# Patient Record
Sex: MALE | Race: WHITE | NOT HISPANIC OR LATINO | URBAN - METROPOLITAN AREA
[De-identification: names, ages, dates, MRNs, and addresses within clinical notes are randomized per-mention and may not be internally consistent; named-entity substitution may affect disease eponyms.]

---

## 2023-05-28 ENCOUNTER — INPATIENT (INPATIENT)
Facility: HOSPITAL | Age: 58
LOS: 4 days | Discharge: HOME | DRG: 580 | End: 2023-06-02
Attending: INTERNAL MEDICINE | Admitting: STUDENT IN AN ORGANIZED HEALTH CARE EDUCATION/TRAINING PROGRAM
Payer: COMMERCIAL

## 2023-05-28 VITALS
TEMPERATURE: 101 F | SYSTOLIC BLOOD PRESSURE: 136 MMHG | DIASTOLIC BLOOD PRESSURE: 90 MMHG | RESPIRATION RATE: 18 BRPM | WEIGHT: 240.08 LBS | HEART RATE: 102 BPM

## 2023-05-28 DIAGNOSIS — Z29.9 ENCOUNTER FOR PROPHYLACTIC MEASURES, UNSPECIFIED: ICD-10-CM

## 2023-05-28 DIAGNOSIS — E78.5 HYPERLIPIDEMIA, UNSPECIFIED: ICD-10-CM

## 2023-05-28 DIAGNOSIS — E03.9 HYPOTHYROIDISM, UNSPECIFIED: ICD-10-CM

## 2023-05-28 DIAGNOSIS — I10 ESSENTIAL (PRIMARY) HYPERTENSION: ICD-10-CM

## 2023-05-28 DIAGNOSIS — D35.2 BENIGN NEOPLASM OF PITUITARY GLAND: ICD-10-CM

## 2023-05-28 DIAGNOSIS — L03.90 CELLULITIS, UNSPECIFIED: ICD-10-CM

## 2023-05-28 DIAGNOSIS — E11.9 TYPE 2 DIABETES MELLITUS WITHOUT COMPLICATIONS: ICD-10-CM

## 2023-05-28 DIAGNOSIS — Z98.890 OTHER SPECIFIED POSTPROCEDURAL STATES: Chronic | ICD-10-CM

## 2023-05-28 DIAGNOSIS — L03.113 CELLULITIS OF RIGHT UPPER LIMB: ICD-10-CM

## 2023-05-28 LAB
ALBUMIN SERPL ELPH-MCNC: 4.6 G/DL — SIGNIFICANT CHANGE UP (ref 3.3–5)
ALP SERPL-CCNC: 38 U/L — LOW (ref 40–120)
ALT FLD-CCNC: 51 U/L — SIGNIFICANT CHANGE UP (ref 12–78)
ANION GAP SERPL CALC-SCNC: 6 MMOL/L — SIGNIFICANT CHANGE UP (ref 5–17)
APTT BLD: 30.4 SEC — SIGNIFICANT CHANGE UP (ref 27.5–35.5)
AST SERPL-CCNC: 26 U/L — SIGNIFICANT CHANGE UP (ref 15–37)
BASOPHILS # BLD AUTO: 0.04 K/UL — SIGNIFICANT CHANGE UP (ref 0–0.2)
BASOPHILS NFR BLD AUTO: 0.4 % — SIGNIFICANT CHANGE UP (ref 0–2)
BILIRUB SERPL-MCNC: 0.7 MG/DL — SIGNIFICANT CHANGE UP (ref 0.2–1.2)
BUN SERPL-MCNC: 17 MG/DL — SIGNIFICANT CHANGE UP (ref 7–23)
CALCIUM SERPL-MCNC: 10 MG/DL — SIGNIFICANT CHANGE UP (ref 8.5–10.1)
CHLORIDE SERPL-SCNC: 105 MMOL/L — SIGNIFICANT CHANGE UP (ref 96–108)
CO2 SERPL-SCNC: 28 MMOL/L — SIGNIFICANT CHANGE UP (ref 22–31)
CREAT SERPL-MCNC: 1.4 MG/DL — HIGH (ref 0.5–1.3)
CRP SERPL-MCNC: 7 MG/L — HIGH
EGFR: 58 ML/MIN/1.73M2 — LOW
EOSINOPHIL # BLD AUTO: 0.17 K/UL — SIGNIFICANT CHANGE UP (ref 0–0.5)
EOSINOPHIL NFR BLD AUTO: 1.5 % — SIGNIFICANT CHANGE UP (ref 0–6)
ERYTHROCYTE [SEDIMENTATION RATE] IN BLOOD: 4 MM/HR — SIGNIFICANT CHANGE UP (ref 0–20)
GLUCOSE SERPL-MCNC: 122 MG/DL — HIGH (ref 70–99)
HCT VFR BLD CALC: 54.8 % — HIGH (ref 39–50)
HGB BLD-MCNC: 18.8 G/DL — HIGH (ref 13–17)
IMM GRANULOCYTES NFR BLD AUTO: 0.4 % — SIGNIFICANT CHANGE UP (ref 0–0.9)
INR BLD: 1.03 RATIO — SIGNIFICANT CHANGE UP (ref 0.88–1.16)
LACTATE SERPL-SCNC: 1.6 MMOL/L — SIGNIFICANT CHANGE UP (ref 0.7–2)
LYMPHOCYTES # BLD AUTO: 1.89 K/UL — SIGNIFICANT CHANGE UP (ref 1–3.3)
LYMPHOCYTES # BLD AUTO: 16.6 % — SIGNIFICANT CHANGE UP (ref 13–44)
MCHC RBC-ENTMCNC: 32.2 PG — SIGNIFICANT CHANGE UP (ref 27–34)
MCHC RBC-ENTMCNC: 34.3 GM/DL — SIGNIFICANT CHANGE UP (ref 32–36)
MCV RBC AUTO: 93.8 FL — SIGNIFICANT CHANGE UP (ref 80–100)
MONOCYTES # BLD AUTO: 0.89 K/UL — SIGNIFICANT CHANGE UP (ref 0–0.9)
MONOCYTES NFR BLD AUTO: 7.8 % — SIGNIFICANT CHANGE UP (ref 2–14)
NEUTROPHILS # BLD AUTO: 8.37 K/UL — HIGH (ref 1.8–7.4)
NEUTROPHILS NFR BLD AUTO: 73.3 % — SIGNIFICANT CHANGE UP (ref 43–77)
NRBC # BLD: 0 /100 WBCS — SIGNIFICANT CHANGE UP (ref 0–0)
PLATELET # BLD AUTO: 277 K/UL — SIGNIFICANT CHANGE UP (ref 150–400)
POTASSIUM SERPL-MCNC: 4.2 MMOL/L — SIGNIFICANT CHANGE UP (ref 3.5–5.3)
POTASSIUM SERPL-SCNC: 4.2 MMOL/L — SIGNIFICANT CHANGE UP (ref 3.5–5.3)
PROT SERPL-MCNC: 8.7 G/DL — HIGH (ref 6–8.3)
PROTHROM AB SERPL-ACNC: 12.1 SEC — SIGNIFICANT CHANGE UP (ref 10.5–13.4)
RBC # BLD: 5.84 M/UL — HIGH (ref 4.2–5.8)
RBC # FLD: 13.3 % — SIGNIFICANT CHANGE UP (ref 10.3–14.5)
SODIUM SERPL-SCNC: 139 MMOL/L — SIGNIFICANT CHANGE UP (ref 135–145)
WBC # BLD: 11.4 K/UL — HIGH (ref 3.8–10.5)
WBC # FLD AUTO: 11.4 K/UL — HIGH (ref 3.8–10.5)

## 2023-05-28 PROCEDURE — 99223 1ST HOSP IP/OBS HIGH 75: CPT | Mod: GC

## 2023-05-28 PROCEDURE — 73130 X-RAY EXAM OF HAND: CPT | Mod: 26,RT

## 2023-05-28 PROCEDURE — 99285 EMERGENCY DEPT VISIT HI MDM: CPT

## 2023-05-28 RX ORDER — EMPAGLIFLOZIN 10 MG/1
0 TABLET, FILM COATED ORAL
Qty: 0 | Refills: 3 | DISCHARGE

## 2023-05-28 RX ORDER — GLUCAGON INJECTION, SOLUTION 0.5 MG/.1ML
1 INJECTION, SOLUTION SUBCUTANEOUS ONCE
Refills: 0 | Status: DISCONTINUED | OUTPATIENT
Start: 2023-05-28 | End: 2023-05-31

## 2023-05-28 RX ORDER — VANCOMYCIN HCL 1 G
1000 VIAL (EA) INTRAVENOUS ONCE
Refills: 0 | Status: COMPLETED | OUTPATIENT
Start: 2023-05-28 | End: 2023-05-28

## 2023-05-28 RX ORDER — METRONIDAZOLE 500 MG
500 TABLET ORAL EVERY 8 HOURS
Refills: 0 | Status: DISCONTINUED | OUTPATIENT
Start: 2023-05-28 | End: 2023-05-30

## 2023-05-28 RX ORDER — INSULIN LISPRO 100/ML
VIAL (ML) SUBCUTANEOUS
Refills: 0 | Status: DISCONTINUED | OUTPATIENT
Start: 2023-05-28 | End: 2023-05-30

## 2023-05-28 RX ORDER — SODIUM CHLORIDE 9 MG/ML
1000 INJECTION INTRAMUSCULAR; INTRAVENOUS; SUBCUTANEOUS ONCE
Refills: 0 | Status: COMPLETED | OUTPATIENT
Start: 2023-05-28 | End: 2023-05-28

## 2023-05-28 RX ORDER — LEVOTHYROXINE SODIUM 125 MCG
0 TABLET ORAL
Qty: 0 | Refills: 3 | DISCHARGE

## 2023-05-28 RX ORDER — ONDANSETRON 8 MG/1
4 TABLET, FILM COATED ORAL EVERY 8 HOURS
Refills: 0 | Status: DISCONTINUED | OUTPATIENT
Start: 2023-05-28 | End: 2023-05-31

## 2023-05-28 RX ORDER — DEXTROSE 50 % IN WATER 50 %
25 SYRINGE (ML) INTRAVENOUS ONCE
Refills: 0 | Status: DISCONTINUED | OUTPATIENT
Start: 2023-05-28 | End: 2023-05-31

## 2023-05-28 RX ORDER — ACETAMINOPHEN 500 MG
975 TABLET ORAL ONCE
Refills: 0 | Status: COMPLETED | OUTPATIENT
Start: 2023-05-28 | End: 2023-05-28

## 2023-05-28 RX ORDER — ICOSAPENT ETHYL 500 MG/1
0 CAPSULE, LIQUID FILLED ORAL
Qty: 0 | Refills: 3 | DISCHARGE

## 2023-05-28 RX ORDER — METFORMIN HYDROCHLORIDE 850 MG/1
0 TABLET ORAL
Qty: 0 | Refills: 3 | DISCHARGE

## 2023-05-28 RX ORDER — LOSARTAN POTASSIUM 100 MG/1
0 TABLET, FILM COATED ORAL
Qty: 0 | Refills: 0 | DISCHARGE

## 2023-05-28 RX ORDER — DEXTROSE 50 % IN WATER 50 %
15 SYRINGE (ML) INTRAVENOUS ONCE
Refills: 0 | Status: DISCONTINUED | OUTPATIENT
Start: 2023-05-28 | End: 2023-05-31

## 2023-05-28 RX ORDER — DEXTROSE 50 % IN WATER 50 %
12.5 SYRINGE (ML) INTRAVENOUS ONCE
Refills: 0 | Status: DISCONTINUED | OUTPATIENT
Start: 2023-05-28 | End: 2023-05-31

## 2023-05-28 RX ORDER — LANOLIN ALCOHOL/MO/W.PET/CERES
3 CREAM (GRAM) TOPICAL AT BEDTIME
Refills: 0 | Status: DISCONTINUED | OUTPATIENT
Start: 2023-05-28 | End: 2023-05-31

## 2023-05-28 RX ORDER — LEVOTHYROXINE SODIUM 125 MCG
50 TABLET ORAL DAILY
Refills: 0 | Status: DISCONTINUED | OUTPATIENT
Start: 2023-05-28 | End: 2023-05-31

## 2023-05-28 RX ORDER — ACETAMINOPHEN 500 MG
650 TABLET ORAL EVERY 6 HOURS
Refills: 0 | Status: DISCONTINUED | OUTPATIENT
Start: 2023-05-28 | End: 2023-05-31

## 2023-05-28 RX ORDER — SACCHAROMYCES BOULARDII 250 MG
250 POWDER IN PACKET (EA) ORAL
Refills: 0 | Status: DISCONTINUED | OUTPATIENT
Start: 2023-05-28 | End: 2023-05-31

## 2023-05-28 RX ORDER — ENOXAPARIN SODIUM 100 MG/ML
40 INJECTION SUBCUTANEOUS EVERY 24 HOURS
Refills: 0 | Status: DISCONTINUED | OUTPATIENT
Start: 2023-05-28 | End: 2023-05-30

## 2023-05-28 RX ORDER — ATORVASTATIN CALCIUM 80 MG/1
40 TABLET, FILM COATED ORAL AT BEDTIME
Refills: 0 | Status: DISCONTINUED | OUTPATIENT
Start: 2023-05-28 | End: 2023-05-31

## 2023-05-28 RX ORDER — SIMVASTATIN 20 MG/1
0 TABLET, FILM COATED ORAL
Qty: 0 | Refills: 3 | DISCHARGE

## 2023-05-28 RX ORDER — CIPROFLOXACIN LACTATE 400MG/40ML
400 VIAL (ML) INTRAVENOUS ONCE
Refills: 0 | Status: DISCONTINUED | OUTPATIENT
Start: 2023-05-28 | End: 2023-05-28

## 2023-05-28 RX ORDER — SODIUM CHLORIDE 9 MG/ML
1000 INJECTION, SOLUTION INTRAVENOUS
Refills: 0 | Status: DISCONTINUED | OUTPATIENT
Start: 2023-05-28 | End: 2023-05-31

## 2023-05-28 RX ORDER — SODIUM CHLORIDE 9 MG/ML
1000 INJECTION, SOLUTION INTRAVENOUS ONCE
Refills: 0 | Status: COMPLETED | OUTPATIENT
Start: 2023-05-28 | End: 2023-05-28

## 2023-05-28 RX ORDER — LOSARTAN POTASSIUM 100 MG/1
100 TABLET, FILM COATED ORAL DAILY
Refills: 0 | Status: DISCONTINUED | OUTPATIENT
Start: 2023-05-28 | End: 2023-05-31

## 2023-05-28 RX ORDER — INSULIN LISPRO 100/ML
VIAL (ML) SUBCUTANEOUS AT BEDTIME
Refills: 0 | Status: DISCONTINUED | OUTPATIENT
Start: 2023-05-28 | End: 2023-05-30

## 2023-05-28 RX ORDER — CABERGOLINE 0.5 MG/1
0 TABLET ORAL
Qty: 0 | Refills: 3 | DISCHARGE

## 2023-05-28 RX ADMIN — Medication 100 MILLIGRAM(S): at 22:30

## 2023-05-28 RX ADMIN — Medication 975 MILLIGRAM(S): at 19:34

## 2023-05-28 RX ADMIN — SODIUM CHLORIDE 1000 MILLILITER(S): 9 INJECTION INTRAMUSCULAR; INTRAVENOUS; SUBCUTANEOUS at 19:34

## 2023-05-28 RX ADMIN — Medication 250 MILLIGRAM(S): at 22:29

## 2023-05-28 RX ADMIN — Medication 100 MILLIGRAM(S): at 20:40

## 2023-05-28 RX ADMIN — ATORVASTATIN CALCIUM 40 MILLIGRAM(S): 80 TABLET, FILM COATED ORAL at 22:30

## 2023-05-28 RX ADMIN — SODIUM CHLORIDE 1000 MILLILITER(S): 9 INJECTION, SOLUTION INTRAVENOUS at 20:48

## 2023-05-28 RX ADMIN — ENOXAPARIN SODIUM 40 MILLIGRAM(S): 100 INJECTION SUBCUTANEOUS at 20:40

## 2023-05-28 NOTE — ED PROVIDER NOTE - PROGRESS NOTE DETAILS
Case discussed with Dr Connell (Hand). Advised to change abx to clindamycin and vancomycin and admit. Will see pt tomorrow.

## 2023-05-28 NOTE — ED PROVIDER NOTE - PHYSICAL EXAMINATION
PE:   GEN: Awake, alert, interactive, NAD, non-toxic appearing.   HEAD: Atraumatic  CARDIAC: Reg rate and rhythm, S1,S2, no murmur/rub/gallop. Strong central and peripheral pulses, Brisk cap refill, no evident pedal edema.   RESP: No distress noted. L/S clear = Bilat without accessory muscle use, wheeze, rhonchi, rales.   ABD: soft, supple, non-tender, no guarding. BS x 4, normoactive.   NEURO: AOx3, CN II-XII grossly intact without focal deficit.   MSK: Moving all extremities with no apparent deformities. unable to bend R index finger secondary to pain and swelling, sensation intact ROM all other fingers on R hand intact   SKIN: puncture wounds noted over proximal aspect of R 2nd and 3rd digits over joint, TTP, swollen, no draining

## 2023-05-28 NOTE — H&P ADULT - PROBLEM SELECTOR PLAN 3
Chronic, History of DM2  - HOLD home meds - Metformin, Jardiance, Empagliflozin   - Continue   - Low dose insulin corrective scale  - Hypoglycemia protocol, fingerstick glucose QAC&HS  - F/u AM HbA1c Chronic, History of DM2  - HOLD home meds - Metformin, Jardiance, Empagliflozin   - Continue low dose insulin corrective scale  - Hypoglycemia protocol, fingerstick glucose QAC&HS  - F/u AM HbA1c

## 2023-05-28 NOTE — ED PROVIDER NOTE - CLINICAL SUMMARY MEDICAL DECISION MAKING FREE TEXT BOX
58-year-old male with a history of diabetes, hyperlipidemia, hypothyroidism, presents with right hand redness and swelling with fever today.  Patient was bitten by his cat that was vaccinated last night.  Bite was on his right hand second and third digits.  This morning woke up with redness warmth and swelling.  Patient was seen in urgent care and sent to the ER for further eval.  Patient with swelling of the right hand redness warmth and swelling and puncture wounds pulses are intact does not extend past the hand has difficulty ranging his second digit.  Will treat with IV antibiotics for cellulitis/ infected bite, hand surg eval for possible asbcess

## 2023-05-28 NOTE — ED PROVIDER NOTE - OBJECTIVE STATEMENT
57 y/o m with pmh DM2 presents to ED for c/o cat bite to R hand. Pt states it was hit own cat and occurred last night. Went to urgent care today for wrosening pain and swelling and was sent to ED for IV abx. Pt statea also now with trouble bending R index finger. Pt is right hand dominant. Denies fever chills. TDAP up to date.

## 2023-05-28 NOTE — H&P ADULT - HISTORY OF PRESENT ILLNESS
58y right hand dominant male with PMHx of type 2 DM, HTN, HLD, hypothyroidism presented to the ED complaining of cat bite to right hand with redness, swelling and fever. 58y right hand dominant (but ambidextrous) male with PMHx of type 2 DM, HTN, HLD, hypothyroidism, pituitary adenoma presented to the ED complaining of cat bite to right hand with redness, swelling and fever. States that it was his mother's cat and recently had bloodwork - up to date on all vaccines. 57 yo right hand dominant (but ambidextrous) male with PMHx of type 2 DM, HTN, HLD, hypothyroidism, pituitary adenoma presented to the ED complaining of cat bite to right hand with redness, swelling and fever. Pt's mother-in-law passed away recently, therefore pt brought home her cat. Yesterday, his mother-in-law's cat bit his right hand near his second MCP joint. The cat recently had blood work - up to date on all vaccines. Pt reports right hand redness, warmth, pain on ROM of the second digit. Pt is ambidextrous, but uses his right hand to write. Pt also plays the piano and drums often.   Denies fever, chills, chest pain, palpitations, SOB, cough, abdominal pain, nausea, vomiting, diarrhea, dizziness, numbness, tingling.  Denies recent travel, recent antibiotic use, or sick contacts.    ED Course:   Vitals: BP: 136/90, HR: 102, Temp: 100.6 F, RR: 18, SpO2: 100% on RA   Labs: wbc: 11.40, H/H: 18.8/54.8, Cr: 1.40  Xray Hand: no acute fx, as per personal read, official read pending   CXR: --- as per personal read, official read pending   Received Tylenol 975 mg x1, 1L NS bolus x1, 1L LR bolus x1, Vanco, Clindamycin in the ED    59 yo right hand dominant (but ambidextrous) male with PMHx of type 2 DM, HTN, HLD, hypothyroidism, pituitary adenoma presented to the ED complaining of cat bite to right hand with redness, swelling and fever. Pt's mother-in-law passed away recently, therefore pt brought home her cat. Yesterday, his mother-in-law's cat bit his right hand near his second MCP joint. The cat recently had blood work - up to date on all vaccines. Pt reports right hand redness, warmth, pain on ROM of the second digit. Pt is ambidextrous, but uses his right hand to write. Pt also plays the piano and drums often.   Denies fever, chills, chest pain, palpitations, SOB, cough, abdominal pain, nausea, vomiting, diarrhea, dizziness, numbness, tingling.  Denies recent travel, recent antibiotic use, or sick contacts.    ED Course:   Vitals: BP: 136/90, HR: 102, Temp: 100.6 F, RR: 18, SpO2: 100% on RA   Labs: wbc: 11.40, H/H: 18.8/54.8, Cr: 1.40  Xray Hand: no acute fx, as per personal read, official read pending  Received Tylenol 975 mg x1, 1L NS bolus x1, 1L LR bolus x1, Vanco, Clindamycin in the ED

## 2023-05-28 NOTE — H&P ADULT - PROBLEM SELECTOR PLAN 1
Pt meets sepsis criteria on admission   - Admit to [facility]  - wbc: 11.40 on admission   - s/p Tylenol 975 mg x1, 1L NS bolus x1, 1L LR bolus x1, Vanco, Clindamycin in the ED   - Continue Vancomycin and Clindamycin as per ID recs   - Keep affected extremity elevated, with warm compresses PRN  - Tylenol 650 mg PO Q6H PRN pain and/or fever  - Follow up BCx x2, MRSA  - Xray R hand: no acute fx as per personal read, f/u official read   - Monitor fever and leukocytosis   - ID Dr. Patton consulted, f/u recs  - Hand surgeon Dr. Valente consulted by ED, recs appreciated Pt meets sepsis criteria on admission   - Admit to [facility]  - wbc: 11.40 on admission   - s/p Tylenol 975 mg x1, 1L NS bolus x1, 1L LR bolus x1, Vanco, Clindamycin in the ED   - Continue Doxycycline and Metronidazole as per ID recs   - Keep affected extremity elevated, with warm compresses PRN  - Tylenol 650 mg PO Q6H PRN pain and/or fever  - Follow up BCx x2, MRSA  - Xray R hand: no acute fx as per personal read, f/u official read   - Monitor fever and leukocytosis   - ID Dr. Patton consulted, f/u recs  - Hand surgeon Dr. Valente consulted by ED, recs appreciated Pt meets sepsis criteria on admission   - Admit to [facility]  - wbc: 11.40 on admission   - s/p Tylenol 975 mg x1, 1L NS bolus x1, 1L LR bolus x1, Vanco, Clindamycin in the ED   - Continue Doxycycline and Metronidazole as per ID recs   - Keep affected extremity elevated, with warm compresses PRN  - Tylenol 650 mg PO Q6H PRN pain and/or fever  - Follow up BCx x2, MRSA PCR, ESR, C-reactive protein  - Xray R hand: no acute fx as per personal read, f/u official read   - Monitor fever and leukocytosis   - ID Dr. Kristina Patton ([facility]) consulted, f/u recs  - Hand surgeon Dr. Valente consulted by ED, recs appreciated

## 2023-05-28 NOTE — PATIENT PROFILE ADULT - FALL HARM RISK - UNIVERSAL INTERVENTIONS
Bed in lowest position, wheels locked, appropriate side rails in place/Call bell, personal items and telephone in reach/Instruct patient to call for assistance before getting out of bed or chair/Non-slip footwear when patient is out of bed/[place] to call system/Physically safe environment - no spills, clutter or unnecessary equipment/Purposeful Proactive Rounding/Room/bathroom lighting operational, light cord in reach

## 2023-05-28 NOTE — ED ADULT TRIAGE NOTE - CHIEF COMPLAINT QUOTE
" cat bite to rt hand Yesterday, swollen , pain" " cat bite to rt hand Yesterday, swollen , pain" from  Care

## 2023-05-28 NOTE — H&P ADULT - NSICDXFAMILYHX_GEN_ALL_CORE_FT
FAMILY HISTORY:  FH: HTN (hypertension)    Father  Still living? Unknown  Family history of CLL (chronic lymphoid leukemia), Age at diagnosis: Age Unknown    Mother  Still living? Unknown  Family history of CLL (chronic lymphoid leukemia), Age at diagnosis: Age Unknown

## 2023-05-28 NOTE — H&P ADULT - ATTENDING COMMENTS
58 year old RHD male with PMHx of type 2 DM, HTN, HLD, hypothyroidism, pituitary adenoma admitted with sepsis secondary to cat bite cellulitis of right hand with concern for deep infection/tenosynovitis. Admit to medicine. Given clinda/vanco in ER per Hand surgery recs to ER provider. Discussed with ID Dr. Patton ([facility]) - recs starting doxy/flagyl to cover MRSA, pasteurella & anaerobes. Hand surgery Dr. Valente to see in AM. If no improvement with IV antibiotics, consider MR imaging of hand. May require surgical intervention if worsening. Follow up culture data. Discussed with patient at bedside. Endorsed case to [facility] hospitalist Dr. Koo.    Agree with H&P as outlined above, edited where appropriate.

## 2023-05-28 NOTE — H&P ADULT - PROBLEM SELECTOR PLAN 5
Stable, gets annual MRI for monitoring   - On Cabergoline once a week on Fridays Stable  - Continue Synthroid

## 2023-05-28 NOTE — H&P ADULT - NSHPPHYSICALEXAM_GEN_ALL_CORE
T(C): 38.1 (05-28-23 @ 18:33), Max: 38.1 (05-28-23 @ 18:33)  HR: 102 (05-28-23 @ 18:33) (102 - 102)  BP: 136/90 (05-28-23 @ 18:33) (136/90 - 136/90)  RR: 18 (05-28-23 @ 18:33) (18 - 18)  SpO2: 99% on RA    General: No apparent distress  Head: normocephalic, atraumatic  Eyes: EOMI, anicteric  ENT: moist mucous membranes, no pharyngeal exudates  Heart: RRR, S1, S2, no murmurs  Chest: CTA b/l, no rales, rhonchi, or wheezes  Abd: BS+, soft, NT, ND  Back: no CVA tenderness  Extr: no edema or cyanosis  RIGHT HAND: puncture wound noted with surrounding erythema, edema and warmth to touch, decreased ROM of first digit  Neuro: AA&Ox3, no focal weakness, sensation to light touch intact  Psych: normal affect

## 2023-05-28 NOTE — H&P ADULT - ASSESSMENT
58 year old RHD male with PMHx of type 2 DM, HTN, HLD, hypothyroidism admitted with sepsis secondary to cat bite cellulitis of right hand. 58 year old RHD male with PMHx of type 2 DM, HTN, HLD, hypothyroidism, pituitary adenoma admitted with sepsis secondary to cat bite cellulitis of right hand with concern for deep infection/tenosynovitis. 58 year old RHD male with PMHx of type 2 DM, HTN, HLD, hypothyroidism, pituitary adenoma admitted with sepsis secondary to cat bite cellulitis of right hand with concern for deep infection/tenosynovitis.

## 2023-05-28 NOTE — H&P ADULT - NSICDXPASTMEDICALHX_GEN_ALL_CORE_FT
PAST MEDICAL HISTORY:  DM2 (diabetes mellitus, type 2)     HLD (hyperlipidemia)     HTN (hypertension)     Hypothyroidism     Pituitary adenoma

## 2023-05-28 NOTE — H&P ADULT - NSHPREVIEWOFSYSTEMS_GEN_ALL_CORE
CONSTITUTIONAL: denies fever, chills, fatigue, weakness  HEENT: denies blurred vision, sore throat  SKIN: admits to right hand swelling and redness   CARDIOVASCULAR: denies chest pain, chest pressure, palpitations  RESPIRATORY: denies shortness of breath, cough, sputum production  GASTROINTESTINAL: denies nausea, vomiting, diarrhea, abdominal pain, melena or hematochezia  GENITOURINARY: denies dysuria, discharge  NEUROLOGICAL: denies numbness, headache, focal weakness  MUSCULOSKELETAL: admits right second digit pain   HEMATOLOGIC: denies gross bleeding, bruising

## 2023-05-29 ENCOUNTER — TRANSCRIPTION ENCOUNTER (OUTPATIENT)
Age: 58
End: 2023-05-29

## 2023-05-29 PROCEDURE — 93010 ELECTROCARDIOGRAM REPORT: CPT

## 2023-05-29 RX ADMIN — Medication 100 MILLIGRAM(S): at 13:33

## 2023-05-29 RX ADMIN — Medication 50 MICROGRAM(S): at 05:33

## 2023-05-29 RX ADMIN — Medication 250 MILLIGRAM(S): at 05:33

## 2023-05-29 RX ADMIN — Medication 100 MILLIGRAM(S): at 21:00

## 2023-05-29 RX ADMIN — Medication 650 MILLIGRAM(S): at 18:52

## 2023-05-29 RX ADMIN — ENOXAPARIN SODIUM 40 MILLIGRAM(S): 100 INJECTION SUBCUTANEOUS at 21:48

## 2023-05-29 RX ADMIN — Medication 100 MILLIGRAM(S): at 21:47

## 2023-05-29 RX ADMIN — Medication 100 MILLIGRAM(S): at 05:33

## 2023-05-29 RX ADMIN — Medication 100 MILLIGRAM(S): at 05:34

## 2023-05-29 RX ADMIN — Medication 250 MILLIGRAM(S): at 18:46

## 2023-05-29 RX ADMIN — ATORVASTATIN CALCIUM 40 MILLIGRAM(S): 80 TABLET, FILM COATED ORAL at 21:48

## 2023-05-29 RX ADMIN — Medication 650 MILLIGRAM(S): at 19:22

## 2023-05-29 NOTE — PROGRESS NOTE ADULT - ASSESSMENT
58M with DM, HTN, HLD, hypothyroidism, pituitary adenoma admitted with hand infection after cat bite  ID following  continue abx  hand surgery to evaluate  MRI  high risk due to proximity to joint (2nd MCP), reduced ROM    HTN/HLD  stable  continue statin losartan    DM  diabetic diet  FS/SS    hypothyroid  continue synthroid    polycytemia  baseline hgb 17  on testosterone  may be related to current disease process

## 2023-05-29 NOTE — CONSULT NOTE ADULT - ASSESSMENT
57 yo male with PMHx of type 2 DM, HTN, HLD, hypothyroidism, pituitary adenoma presented to the ED complaining of cat bite to right hand with redness, swelling and fever.   Yesterday, his mother-in-law's cat bit his right hand near his second MCP joint.   cellulitis r/o deep infection    plan  xray not impressive  check mri  pt got tetanus at urgent care  cont doxycycline + flagyl  keep elevated  serial exam  trend wbc   Dr Connell evaluation pending   d/w Dr Koo

## 2023-05-30 ENCOUNTER — TRANSCRIPTION ENCOUNTER (OUTPATIENT)
Age: 58
End: 2023-05-30

## 2023-05-30 LAB
ANION GAP SERPL CALC-SCNC: 2 MMOL/L — LOW (ref 5–17)
BASOPHILS # BLD AUTO: 0.05 K/UL — SIGNIFICANT CHANGE UP (ref 0–0.2)
BASOPHILS NFR BLD AUTO: 0.7 % — SIGNIFICANT CHANGE UP (ref 0–2)
BUN SERPL-MCNC: 13 MG/DL — SIGNIFICANT CHANGE UP (ref 7–23)
CALCIUM SERPL-MCNC: 9.1 MG/DL — SIGNIFICANT CHANGE UP (ref 8.5–10.1)
CHLORIDE SERPL-SCNC: 105 MMOL/L — SIGNIFICANT CHANGE UP (ref 96–108)
CO2 SERPL-SCNC: 28 MMOL/L — SIGNIFICANT CHANGE UP (ref 22–31)
CREAT SERPL-MCNC: 1.3 MG/DL — SIGNIFICANT CHANGE UP (ref 0.5–1.3)
CRP SERPL-MCNC: 22 MG/L — HIGH
EGFR: 63 ML/MIN/1.73M2 — SIGNIFICANT CHANGE UP
EOSINOPHIL # BLD AUTO: 0.3 K/UL — SIGNIFICANT CHANGE UP (ref 0–0.5)
EOSINOPHIL NFR BLD AUTO: 4.1 % — SIGNIFICANT CHANGE UP (ref 0–6)
GLUCOSE SERPL-MCNC: 227 MG/DL — HIGH (ref 70–99)
HCT VFR BLD CALC: 50.6 % — HIGH (ref 39–50)
HGB BLD-MCNC: 16.9 G/DL — SIGNIFICANT CHANGE UP (ref 13–17)
IMM GRANULOCYTES NFR BLD AUTO: 0.4 % — SIGNIFICANT CHANGE UP (ref 0–0.9)
LYMPHOCYTES # BLD AUTO: 1.49 K/UL — SIGNIFICANT CHANGE UP (ref 1–3.3)
LYMPHOCYTES # BLD AUTO: 20.2 % — SIGNIFICANT CHANGE UP (ref 13–44)
MCHC RBC-ENTMCNC: 31.4 PG — SIGNIFICANT CHANGE UP (ref 27–34)
MCHC RBC-ENTMCNC: 33.4 GM/DL — SIGNIFICANT CHANGE UP (ref 32–36)
MCV RBC AUTO: 94.1 FL — SIGNIFICANT CHANGE UP (ref 80–100)
MONOCYTES # BLD AUTO: 0.54 K/UL — SIGNIFICANT CHANGE UP (ref 0–0.9)
MONOCYTES NFR BLD AUTO: 7.3 % — SIGNIFICANT CHANGE UP (ref 2–14)
NEUTROPHILS # BLD AUTO: 4.96 K/UL — SIGNIFICANT CHANGE UP (ref 1.8–7.4)
NEUTROPHILS NFR BLD AUTO: 67.3 % — SIGNIFICANT CHANGE UP (ref 43–77)
NRBC # BLD: 0 /100 WBCS — SIGNIFICANT CHANGE UP (ref 0–0)
PLATELET # BLD AUTO: 207 K/UL — SIGNIFICANT CHANGE UP (ref 150–400)
POTASSIUM SERPL-MCNC: 4.2 MMOL/L — SIGNIFICANT CHANGE UP (ref 3.5–5.3)
POTASSIUM SERPL-SCNC: 4.2 MMOL/L — SIGNIFICANT CHANGE UP (ref 3.5–5.3)
RBC # BLD: 5.38 M/UL — SIGNIFICANT CHANGE UP (ref 4.2–5.8)
RBC # FLD: 13.1 % — SIGNIFICANT CHANGE UP (ref 10.3–14.5)
SODIUM SERPL-SCNC: 135 MMOL/L — SIGNIFICANT CHANGE UP (ref 135–145)
WBC # BLD: 7.37 K/UL — SIGNIFICANT CHANGE UP (ref 3.8–10.5)
WBC # FLD AUTO: 7.37 K/UL — SIGNIFICANT CHANGE UP (ref 3.8–10.5)

## 2023-05-30 PROCEDURE — 73220 MRI UPPR EXTREMITY W/O&W/DYE: CPT | Mod: 26,RT

## 2023-05-30 RX ORDER — BACITRACIN ZINC 500 UNIT/G
1 OINTMENT IN PACKET (EA) TOPICAL
Refills: 0 | Status: DISCONTINUED | OUTPATIENT
Start: 2023-05-30 | End: 2023-05-31

## 2023-05-30 RX ORDER — CEFTRIAXONE 500 MG/1
2000 INJECTION, POWDER, FOR SOLUTION INTRAMUSCULAR; INTRAVENOUS EVERY 24 HOURS
Refills: 0 | Status: DISCONTINUED | OUTPATIENT
Start: 2023-05-30 | End: 2023-05-31

## 2023-05-30 RX ORDER — METFORMIN HYDROCHLORIDE 850 MG/1
500 TABLET ORAL THREE TIMES A DAY
Refills: 0 | Status: DISCONTINUED | OUTPATIENT
Start: 2023-05-30 | End: 2023-05-31

## 2023-05-30 RX ADMIN — Medication 50 MICROGRAM(S): at 06:44

## 2023-05-30 RX ADMIN — METFORMIN HYDROCHLORIDE 500 MILLIGRAM(S): 850 TABLET ORAL at 22:30

## 2023-05-30 RX ADMIN — CEFTRIAXONE 100 MILLIGRAM(S): 500 INJECTION, POWDER, FOR SOLUTION INTRAMUSCULAR; INTRAVENOUS at 14:08

## 2023-05-30 RX ADMIN — Medication 100 MILLIGRAM(S): at 06:43

## 2023-05-30 RX ADMIN — Medication 1 APPLICATION(S): at 18:08

## 2023-05-30 RX ADMIN — LOSARTAN POTASSIUM 100 MILLIGRAM(S): 100 TABLET, FILM COATED ORAL at 06:44

## 2023-05-30 RX ADMIN — Medication 100 MILLIGRAM(S): at 18:08

## 2023-05-30 RX ADMIN — Medication 3 MILLIGRAM(S): at 22:30

## 2023-05-30 RX ADMIN — Medication 650 MILLIGRAM(S): at 15:03

## 2023-05-30 RX ADMIN — Medication 650 MILLIGRAM(S): at 14:08

## 2023-05-30 RX ADMIN — Medication 650 MILLIGRAM(S): at 06:53

## 2023-05-30 RX ADMIN — ATORVASTATIN CALCIUM 40 MILLIGRAM(S): 80 TABLET, FILM COATED ORAL at 22:30

## 2023-05-30 RX ADMIN — Medication 650 MILLIGRAM(S): at 07:15

## 2023-05-30 RX ADMIN — Medication 250 MILLIGRAM(S): at 07:16

## 2023-05-30 RX ADMIN — Medication 250 MILLIGRAM(S): at 18:08

## 2023-05-30 RX ADMIN — Medication 650 MILLIGRAM(S): at 21:45

## 2023-05-30 RX ADMIN — Medication 650 MILLIGRAM(S): at 20:45

## 2023-05-30 RX ADMIN — Medication 100 MILLIGRAM(S): at 06:44

## 2023-05-30 NOTE — CARE COORDINATION ASSESSMENT. - NSPASTMEDSURGHISTORY_GEN_ALL_CORE_FT
PAST MEDICAL & SURGICAL HISTORY:  Hypothyroidism      Pituitary adenoma      HLD (hyperlipidemia)      HTN (hypertension)      DM2 (diabetes mellitus, type 2)      S/P rotator cuff surgery

## 2023-05-30 NOTE — PROGRESS NOTE ADULT - ASSESSMENT
59 yo male with PMHx of type 2 DM, HTN, HLD, hypothyroidism, pituitary adenoma presented to the ED complaining of cat bite to right hand with redness, swelling and fever, his mother-in-law's cat bit his right hand into his second MCP joint. Seen by Hand and underwent initial I&D of right hand.  MRI-Moderate joint effusion of the second metacarpophalangeal joint with enhancing synovitis, concerning for septic arthritis given the history of a puncture wound. Minimal osseous edema along the dorsal aspect of the second metacarpal head/neck without T1 marrow signal abnormality, which may represent contusion or be related to osteitis/early osteoarthritis.    RECOMMENDATIONS  Cat bite with what appears to be penetration into second MCP joint so septic arthritis, concerns for cat mouth microbes and skin. PCN allergy is just a brandin and questionable. Doxy is reasonable coverage for this context but will dc metronidazole so for now recommend  -Doxycycline 100mg PO BID  -Ceftriaxone 2 grams IV daily (may adjust if a reaction and based on any micro data)  anticipate 6 weeks abx but potential for oral if option with good bioavail  -hand surgeon to take to OR for joint washout.    Thank you for consulting us and involving us in the management of this most interesting and challenging case.  We will follow along in the care of this patient. Please call us at [phone] or text me directly on my cell# at [phone] with any concerns.

## 2023-05-30 NOTE — CARE COORDINATION ASSESSMENT. - REASON FOR CONSULT
Case management consult noted for stairs within then home.  Patient states he is fully independent with ambulation and has no issues navigating stairs.

## 2023-05-30 NOTE — CARE COORDINATION ASSESSMENT. - OTHER PERTINENT DISCHARGE PLANNING INFORMATION:
Met with patient at bedside to discuss the role of case management with verbalized understanding.  Needs unclear at present.  Patient admitted with cellulitis to right hand from cat bite and is currently on IVAX. WIll continue to follow from a case management perspective.

## 2023-05-30 NOTE — CARE COORDINATION ASSESSMENT. - NSCAREPROVIDERS_GEN_ALL_CORE_FT
CARE PROVIDERS:  Accepting Physician: Robin Fuentes  Admitting: Robin Fuentes  Attending: Robin Fuentes  Case Management: Brown Bonilla  Consultant: Penny Valente  Consultant: Weil, Patricia  Consultant: Kristina Patton ED ACP: Isabel Wade ED Attending: Flaca Rodriguez ED Nurse: Miguel Singh  Nurse: Vilma Barrett  Nurse: Heather Sanchez  Ordered: Doctor, Unknown  Ordered: ADM, User  Outpatient Provider: Nate Koo  Override: Rajani Chamorro  Override: Karley Conrad  Override: Lucila Hernandez  PCA/Nursing Assistant: Lucila Hernandez  Primary Team: Robin Fuentes  Registered Dietitian: Aziza Leahy  : Huyen Carrillo

## 2023-05-30 NOTE — CONSULT NOTE ADULT - SUBJECTIVE AND OBJECTIVE BOX
See dictated note.  Written informed consent obtained and underwent initial I&D of right hand.  Culture sent.  MRI results just noted and discussed with Dr Koo.  Awaiting ID input re: possible need for further deeper drainage in OR soon.
[facility], Division of Infectious Diseases  ROGER Fernandez S. Shah, Y. Patel, G. Guy   [phone]  after hours and weekends [phone]    DAYAMI HARDY  58y, Male  [id number]      HPI:  59 yo male with PMHx of type 2 DM, HTN, HLD, hypothyroidism, pituitary adenoma presented to the ED complaining of cat bite to right hand with redness, swelling and fever.   Yesterday, his mother-in-law's cat bit his right hand near his second MCP joint. The cat recently had blood work - up to date on all vaccines.   Pt reports right hand redness, warmth, pain on ROM of the second digit.  Denies fever, chills, chest pain, palpitations, SOB, cough, abdominal pain, nausea, vomiting, diarrhea, dizziness, numbness, tingling.  Denies recent travel, recent antibiotic use, or sick contacts.  has limited motion but less red    PMH/PSH--  DM2 (diabetes mellitus, type 2)  HTN (hypertension)  HLD (hyperlipidemia)  Pituitary adenoma  Hypothyroidism  S/P rotator cuff surgery        Allergies--pcn      Medications--  Antibiotics: doxycycline IVPB 100 milliGRAM(s) IV Intermittent every 12 hours  metroNIDAZOLE  IVPB 500 milliGRAM(s) IV Intermittent every 8 hours    Immunologic:   Other: acetaminophen     Tablet .. PRN  atorvastatin  dextrose 5%.  dextrose 5%.  dextrose 50% Injectable  dextrose 50% Injectable  dextrose 50% Injectable  dextrose Oral Gel PRN  enoxaparin Injectable  glucagon  Injectable  insulin lispro (ADMELOG) corrective regimen sliding scale  insulin lispro (ADMELOG) corrective regimen sliding scale  levothyroxine  losartan  melatonin PRN  ondansetron Injectable PRN  saccharomyces boulardii      Social History--  EtOH: denies ***  Tobacco: denies ***  Drug Use: denies ***    Family/Marital History--  Family history of CLL (chronic lymphoid leukemia) (Father, Mother)  FH: HTN (hypertension)          Travel/Environmental/Occupational History:      Review of Systems:  REVIEW OF SYSTEMS  General: no fever, no chills, no wt loss	  Ophthalmologic: no blurry vision  Respiratory and Thorax: no cough, no dyspnea  Cardiovascular: no chest pain, no palpitations  Gastrointestinal:  no nausea, no vomiting, diarrhea  Genitourinary: no dysuria, no urgency, no frequency	  Musculoskeletal: no myalgias	  Neurological:  no headache	    Physical Exam--  Vital Signs: T(F): 98.4 (05-29-23 @ 05:25), Max: 100.6 (05-28-23 @ 18:33)  HR: 64 (05-29-23 @ 05:25)  BP: 108/77 (05-29-23 @ 05:25)  RR: 18 (05-29-23 @ 05:25)  SpO2: 96% (05-29-23 @ 05:25)  Wt(kg): --  General: Nontoxic-appearing Male in no acute distress.  HEENT: AT/NC.  Neck: Not rigid. No sense of mass.  Nodes: None palpable.  Lungs: Clear bilaterally without rales, wheezing or rhonchi  Heart: Regular rate and rhythm. No Murmur. No rub. No gallop. No palpable thrill.  Abdomen: Bowel sounds present and normoactive. Soft. Nondistended. Nontender.   Back: No spinal tenderness. No costovertebral angle tenderness.   Extremities: No cyanosis or clubbing. No edema. right hand edema  no erythema no discharge not hot   Skin: Warm. Dry. Good turgor. No rash. No vasculitic stigmata.  Psychiatric: Appropriate affect and mood for situation.         Laboratory & Imaging Data--  CBC                        18.8   11.40 )-----------( 277      ( 28 May 2023 19:18 )             54.8       Chemistries  05-28    139  |  105  |  17  ----------------------------<  122<H>  4.2   |  28  |  1.40<H>    Ca    10.0      28 May 2023 19:18    TPro  8.7<H>  /  Alb  4.6  /  TBili  0.7  /  DBili  x   /  AST  26  /  ALT  51  /  AlkPhos  38<L>  05-28      Culture Data    < from: Xray Hand 3 Views, Right (05.28.23 @ 19:06) >    ACC: [id number] EXAM:  XR HAND MIN 3 VIEWS RT   ORDERED BY: SHALONDA CROUCH     PROCEDURE DATE:  05/28/2023          INTERPRETATION:  Radiographs of the RIGHT hand    CLINICAL INFORMATION:  Injury with  Pain. Cat bite    TECHNIQUE:  Frontal, oblique and lateral views of the hand were obtained.    FINDINGS:   No prior examinations are available for review.    The osseous and joint structures of the hand are radiographically intact,   without fracture or malalignment  No soft tissue abnormalities or radiopaque foreign body.      IMPRESSION:   No acute radiographic osseous pathology..    < end of copied text >

## 2023-05-30 NOTE — PROGRESS NOTE ADULT - ASSESSMENT
58M with DM, HTN, HLD, hypothyroidism, pituitary adenoma admitted with hand infection after cat bite  ID and hand surgery following  continue abx  MRI with septic arthritis possible osteomyelitis  for surgical washout tomorrow  discussed with ID and plastics  medically acceptable for procedure    HTN/HLD  stable  continue statin losartan    DM  diabetic diet  FS/SS    hypothyroid  continue synthroid    polycytemia  baseline hgb 17  on testosterone  may be related to current disease process  improved

## 2023-05-31 ENCOUNTER — TRANSCRIPTION ENCOUNTER (OUTPATIENT)
Age: 58
End: 2023-05-31

## 2023-05-31 RX ORDER — LANOLIN ALCOHOL/MO/W.PET/CERES
3 CREAM (GRAM) TOPICAL AT BEDTIME
Refills: 0 | Status: DISCONTINUED | OUTPATIENT
Start: 2023-05-31 | End: 2023-06-02

## 2023-05-31 RX ORDER — SODIUM CHLORIDE 9 MG/ML
1000 INJECTION, SOLUTION INTRAVENOUS
Refills: 0 | Status: DISCONTINUED | OUTPATIENT
Start: 2023-05-31 | End: 2023-06-02

## 2023-05-31 RX ORDER — ATORVASTATIN CALCIUM 80 MG/1
40 TABLET, FILM COATED ORAL AT BEDTIME
Refills: 0 | Status: DISCONTINUED | OUTPATIENT
Start: 2023-05-31 | End: 2023-06-02

## 2023-05-31 RX ORDER — ONDANSETRON 8 MG/1
4 TABLET, FILM COATED ORAL EVERY 8 HOURS
Refills: 0 | Status: DISCONTINUED | OUTPATIENT
Start: 2023-05-31 | End: 2023-06-02

## 2023-05-31 RX ORDER — SODIUM CHLORIDE 9 MG/ML
1000 INJECTION, SOLUTION INTRAVENOUS
Refills: 0 | Status: DISCONTINUED | OUTPATIENT
Start: 2023-05-31 | End: 2023-05-31

## 2023-05-31 RX ORDER — ENOXAPARIN SODIUM 100 MG/ML
40 INJECTION SUBCUTANEOUS EVERY 24 HOURS
Refills: 0 | Status: DISCONTINUED | OUTPATIENT
Start: 2023-06-01 | End: 2023-06-02

## 2023-05-31 RX ORDER — DEXTROSE 50 % IN WATER 50 %
25 SYRINGE (ML) INTRAVENOUS ONCE
Refills: 0 | Status: DISCONTINUED | OUTPATIENT
Start: 2023-05-31 | End: 2023-06-02

## 2023-05-31 RX ORDER — OXYCODONE HYDROCHLORIDE 5 MG/1
5 TABLET ORAL EVERY 4 HOURS
Refills: 0 | Status: DISCONTINUED | OUTPATIENT
Start: 2023-05-31 | End: 2023-06-02

## 2023-05-31 RX ORDER — LOSARTAN POTASSIUM 100 MG/1
100 TABLET, FILM COATED ORAL DAILY
Refills: 0 | Status: DISCONTINUED | OUTPATIENT
Start: 2023-05-31 | End: 2023-06-02

## 2023-05-31 RX ORDER — INSULIN LISPRO 100/ML
VIAL (ML) SUBCUTANEOUS
Refills: 0 | Status: DISCONTINUED | OUTPATIENT
Start: 2023-05-31 | End: 2023-06-02

## 2023-05-31 RX ORDER — SACCHAROMYCES BOULARDII 250 MG
250 POWDER IN PACKET (EA) ORAL
Refills: 0 | Status: DISCONTINUED | OUTPATIENT
Start: 2023-05-31 | End: 2023-06-02

## 2023-05-31 RX ORDER — LEVOTHYROXINE SODIUM 125 MCG
50 TABLET ORAL DAILY
Refills: 0 | Status: DISCONTINUED | OUTPATIENT
Start: 2023-05-31 | End: 2023-06-02

## 2023-05-31 RX ORDER — CEFTRIAXONE 500 MG/1
1000 INJECTION, POWDER, FOR SOLUTION INTRAMUSCULAR; INTRAVENOUS EVERY 24 HOURS
Refills: 0 | Status: DISCONTINUED | OUTPATIENT
Start: 2023-05-31 | End: 2023-06-01

## 2023-05-31 RX ORDER — HYDROMORPHONE HYDROCHLORIDE 2 MG/ML
0.5 INJECTION INTRAMUSCULAR; INTRAVENOUS; SUBCUTANEOUS
Refills: 0 | Status: DISCONTINUED | OUTPATIENT
Start: 2023-05-31 | End: 2023-05-31

## 2023-05-31 RX ORDER — DEXTROSE 50 % IN WATER 50 %
15 SYRINGE (ML) INTRAVENOUS ONCE
Refills: 0 | Status: DISCONTINUED | OUTPATIENT
Start: 2023-05-31 | End: 2023-06-02

## 2023-05-31 RX ORDER — OXYCODONE HYDROCHLORIDE 5 MG/1
5 TABLET ORAL ONCE
Refills: 0 | Status: DISCONTINUED | OUTPATIENT
Start: 2023-05-31 | End: 2023-05-31

## 2023-05-31 RX ORDER — METOCLOPRAMIDE HCL 10 MG
5 TABLET ORAL ONCE
Refills: 0 | Status: DISCONTINUED | OUTPATIENT
Start: 2023-05-31 | End: 2023-05-31

## 2023-05-31 RX ORDER — ACETAMINOPHEN 500 MG
1000 TABLET ORAL EVERY 6 HOURS
Refills: 0 | Status: DISCONTINUED | OUTPATIENT
Start: 2023-05-31 | End: 2023-06-02

## 2023-05-31 RX ORDER — GLUCAGON INJECTION, SOLUTION 0.5 MG/.1ML
1 INJECTION, SOLUTION SUBCUTANEOUS ONCE
Refills: 0 | Status: DISCONTINUED | OUTPATIENT
Start: 2023-05-31 | End: 2023-06-02

## 2023-05-31 RX ORDER — DEXTROSE 50 % IN WATER 50 %
12.5 SYRINGE (ML) INTRAVENOUS ONCE
Refills: 0 | Status: DISCONTINUED | OUTPATIENT
Start: 2023-05-31 | End: 2023-06-02

## 2023-05-31 RX ADMIN — Medication 1000 MILLIGRAM(S): at 12:50

## 2023-05-31 RX ADMIN — Medication 100 MILLIGRAM(S): at 18:25

## 2023-05-31 RX ADMIN — SODIUM CHLORIDE 125 MILLILITER(S): 9 INJECTION, SOLUTION INTRAVENOUS at 08:59

## 2023-05-31 RX ADMIN — LOSARTAN POTASSIUM 100 MILLIGRAM(S): 100 TABLET, FILM COATED ORAL at 05:20

## 2023-05-31 RX ADMIN — METFORMIN HYDROCHLORIDE 500 MILLIGRAM(S): 850 TABLET ORAL at 05:20

## 2023-05-31 RX ADMIN — CEFTRIAXONE 100 MILLIGRAM(S): 500 INJECTION, POWDER, FOR SOLUTION INTRAMUSCULAR; INTRAVENOUS at 12:03

## 2023-05-31 RX ADMIN — Medication 1000 MILLIGRAM(S): at 11:55

## 2023-05-31 RX ADMIN — Medication 100 MILLIGRAM(S): at 05:20

## 2023-05-31 RX ADMIN — Medication 1000 MILLIGRAM(S): at 18:00

## 2023-05-31 RX ADMIN — Medication 50 MICROGRAM(S): at 05:20

## 2023-05-31 RX ADMIN — Medication 1000 MILLIGRAM(S): at 18:45

## 2023-05-31 RX ADMIN — ATORVASTATIN CALCIUM 40 MILLIGRAM(S): 80 TABLET, FILM COATED ORAL at 21:22

## 2023-05-31 RX ADMIN — Medication 250 MILLIGRAM(S): at 18:24

## 2023-05-31 RX ADMIN — Medication 250 MILLIGRAM(S): at 05:21

## 2023-05-31 NOTE — BRIEF OPERATIVE NOTE - NSICDXBRIEFPREOP_GEN_ALL_CORE_FT
Yes I see shoulder MRI that had rotator cuff tendinosis in addition to possible subacromial bursitis. We can evaluate these locations at next visit and incorporate into next steps in care plan.    PRE-OP DIAGNOSIS:  Infection of hand 31-May-2023 09:16:24  Anupam Orozco

## 2023-05-31 NOTE — PROGRESS NOTE ADULT - ASSESSMENT
58M with DM, HTN, HLD, hypothyroidism, pituitary adenoma admitted with hand infection after cat bite  ID and hand surgery following  continue abx  MRI with septic arthritis MCP jt possible osteomyelitis  sp surgical washout 5/31  discussed with plastics  cont current abx regimen  fu ID recs      #HTN/HLD  stable  continue statin losartan    #DM  diabetic diet  FS/SS    #hypothyroid  continue synthroid    #polycythemia  baseline hgb 17  on testosterone  may be related to current disease process  improved    #Pituitary adenoma- resume home meds    #DVT ppx- teds    [facility]   [phone]

## 2023-05-31 NOTE — PROVIDER CONTACT NOTE (OTHER) - ASSESSMENT
pt. stable, last blood glucose pt. stable, last blood glucose 104 at pt. stable, last blood glucose 104 at 17:28 on 5/30/23

## 2023-05-31 NOTE — PROGRESS NOTE ADULT - ASSESSMENT
59 yo male with PMHx of type 2 DM, HTN, HLD, hypothyroidism, pituitary adenoma presented to the ED complaining of cat bite to right hand with redness, swelling and fever, his mother-in-law's cat bit his right hand into his second MCP joint. Seen by Hand and underwent initial I&D of right hand.  MRI-Moderate joint effusion of the second metacarpophalangeal joint with enhancing synovitis, concerning for septic arthritis given the history of a puncture wound. Minimal osseous edema along the dorsal aspect of the second metacarpal head/neck without T1 marrow signal abnormality, which may represent contusion or be related to osteitis/early osteoarthritis.    RECOMMENDATIONS  Cat bite with what appears to be penetration into second MCP joint so septic arthritis, concerns for cat mouth microbes and skin. PCN allergy is just a rash and questionable. continue  -Doxycycline 100mg PO BID  -Ceftriaxone 2 grams IV daily (may adjust based on any micro data)  anticipate 6 weeks abx but potential for oral if option with good bioavail  -hand surgeon to took to OR for joint washout. (Incision and drainage, wound, hand 31-May-2023 09:15:14  Anupam Orozco)    Thank you for consulting us and involving us in the management of this most interesting and challenging case.  We will follow along in the care of this patient. Please call us at [phone] or text me directly on my cell# at [phone] with any concerns.

## 2023-06-01 PROCEDURE — 76937 US GUIDE VASCULAR ACCESS: CPT | Mod: 26

## 2023-06-01 PROCEDURE — 36573 INSJ PICC RS&I 5 YR+: CPT

## 2023-06-01 RX ORDER — CEFTRIAXONE 500 MG/1
2000 INJECTION, POWDER, FOR SOLUTION INTRAMUSCULAR; INTRAVENOUS EVERY 24 HOURS
Refills: 0 | Status: DISCONTINUED | OUTPATIENT
Start: 2023-06-02 | End: 2023-06-02

## 2023-06-01 RX ORDER — CEFTRIAXONE 500 MG/1
2000 INJECTION, POWDER, FOR SOLUTION INTRAMUSCULAR; INTRAVENOUS ONCE
Refills: 0 | Status: COMPLETED | OUTPATIENT
Start: 2023-06-01 | End: 2023-06-01

## 2023-06-01 RX ORDER — BACITRACIN ZINC 500 UNIT/G
1 OINTMENT IN PACKET (EA) TOPICAL
Refills: 0 | Status: DISCONTINUED | OUTPATIENT
Start: 2023-06-01 | End: 2023-06-02

## 2023-06-01 RX ADMIN — Medication 100 MILLIGRAM(S): at 05:18

## 2023-06-01 RX ADMIN — Medication 1 APPLICATION(S): at 16:45

## 2023-06-01 RX ADMIN — ATORVASTATIN CALCIUM 40 MILLIGRAM(S): 80 TABLET, FILM COATED ORAL at 22:19

## 2023-06-01 RX ADMIN — Medication 1000 MILLIGRAM(S): at 01:01

## 2023-06-01 RX ADMIN — Medication 250 MILLIGRAM(S): at 18:22

## 2023-06-01 RX ADMIN — LOSARTAN POTASSIUM 100 MILLIGRAM(S): 100 TABLET, FILM COATED ORAL at 05:18

## 2023-06-01 RX ADMIN — Medication 1000 MILLIGRAM(S): at 19:25

## 2023-06-01 RX ADMIN — Medication 3 MILLIGRAM(S): at 00:00

## 2023-06-01 RX ADMIN — Medication 1000 MILLIGRAM(S): at 18:22

## 2023-06-01 RX ADMIN — Medication 1000 MILLIGRAM(S): at 23:58

## 2023-06-01 RX ADMIN — Medication 1000 MILLIGRAM(S): at 23:28

## 2023-06-01 RX ADMIN — Medication 1000 MILLIGRAM(S): at 06:19

## 2023-06-01 RX ADMIN — Medication 1000 MILLIGRAM(S): at 00:01

## 2023-06-01 RX ADMIN — Medication 1000 MILLIGRAM(S): at 05:19

## 2023-06-01 RX ADMIN — Medication 250 MILLIGRAM(S): at 05:18

## 2023-06-01 RX ADMIN — Medication 50 MICROGRAM(S): at 05:18

## 2023-06-01 RX ADMIN — Medication 1000 MILLIGRAM(S): at 14:10

## 2023-06-01 RX ADMIN — CEFTRIAXONE 100 MILLIGRAM(S): 500 INJECTION, POWDER, FOR SOLUTION INTRAMUSCULAR; INTRAVENOUS at 15:23

## 2023-06-01 RX ADMIN — Medication 1000 MILLIGRAM(S): at 13:34

## 2023-06-01 NOTE — PROVIDER CONTACT NOTE (OTHER) - ASSESSMENT
pt. stable, AOX4, ambulatory, walked in the hallway overnight & to the bathroom, pt. educated on the risks of refusing Lovenox, pt. verbalized understanding but still refused

## 2023-06-01 NOTE — PROGRESS NOTE ADULT - ASSESSMENT
59 yo male with PMHx of type 2 DM, HTN, HLD, hypothyroidism, pituitary adenoma presented to the ED complaining of cat bite to right hand with redness, swelling and fever, his mother-in-law's cat bit his right hand into his second MCP joint. Seen by Hand and underwent initial I&D of right hand.  MRI-Moderate joint effusion of the second metacarpophalangeal joint with enhancing synovitis, concerning for septic arthritis given the history of a puncture wound. Minimal osseous edema along the dorsal aspect of the second metacarpal head/neck without T1 marrow signal abnormality, which may represent contusion or be related to osteitis/early osteoarthritis.    RECOMMENDATIONS  Cat bite with what appears to be penetration into second MCP joint so septic arthritis, concerns for cat mouth microbes and skin. PCN allergy is just a rash and questionable.  -hand surgeon to took to OR for joint washout. (Incision and drainage, wound, hand 31-May-2023 09:15:14  Anupam Orozco)  -all micro is NGTS, no MRSA, no pseudomonas    -Ceftriaxone 2 grams IV daily with  last day 6/28  weekly labs CBC, CMP, ESR faxed to [phone]  possible dc 6/2 and after IV may extend with oral Rx    Thank you for consulting us and involving us in the management of this most interesting and challenging case.  We will follow along in the care of this patient. Please call us at [phone] or text me directly on my cell# at [phone] with any concerns.       57 yo male with PMHx of type 2 DM, HTN, HLD, hypothyroidism, pituitary adenoma presented to the ED complaining of cat bite to right hand with redness, swelling and fever, his mother-in-law's cat bit his right hand into his second MCP joint. Seen by Hand and underwent initial I&D of right hand.  MRI-Moderate joint effusion of the second metacarpophalangeal joint with enhancing synovitis, concerning for septic arthritis given the history of a puncture wound. Minimal osseous edema along the dorsal aspect of the second metacarpal head/neck without T1 marrow signal abnormality, which may represent contusion or be related to osteitis/early osteoarthritis.    RECOMMENDATIONS  Cat bite with what appears to be penetration into second MCP joint so septic arthritis, concerns for cat mouth microbes and skin. PCN allergy is just a rash and questionable.  -hand surgeon to took to OR for joint washout. (Incision and drainage, wound, hand 31-May-2023 09:15:14  Anupam Orozco)  -all micro is NGTD, no MRSA, no pseudomonas    -Ceftriaxone 2 grams IV daily with  last day 6/28  weekly labs CBC, CMP, ESR faxed to [phone]  possible dc 6/2 and after IV may extend with oral Rx    Thank you for consulting us and involving us in the management of this most interesting and challenging case.  We will follow along in the care of this patient. Please call us at [phone] or text me directly on my cell# at [phone] with any concerns.

## 2023-06-01 NOTE — PROGRESS NOTE ADULT - ASSESSMENT
59 y/o M s/p I&D of Right Hand wounds (cat bite) 5/31, POD #1.      1. Antibiotics per ID  2. Please have the nursing team soak the hand in saline for 30 minutes, then dress the wounds with bacitracin and wrap with dry guaze and cling. This is to be done every shift. Wound care orders placed in the EMR.   3. Once discharged, will not need home care for the wounds. He can wash them daily with warm soapy water then dress them with either bacitracin or neosporin and wrap or cover with guaze.   4. Please have the patient follow up with Dr. Valente in the office in 1 week.  5. No need to immobilize the hand or fingers  6. Care per primary team  7. Discussed with Dr. Valente and the patient.

## 2023-06-01 NOTE — PROGRESS NOTE ADULT - ASSESSMENT
58M with DM, HTN, HLD, hypothyroidism, pituitary adenoma admitted with hand infection after cat bite  ID and hand surgery following  continue abx  MRI with septic arthritis MCP jt possible osteomyelitis  sp surgical washout 5/31  discussed with plastics  cont current abx regimen  fu ID recs      #HTN/HLD  stable  continue statin losartan    #DM  diabetic diet  FS/SS    #hypothyroid  continue synthroid    #polycythemia  baseline hgb 17  on testosterone  may be related to current disease process  improved    #Pituitary adenoma- resume home meds    #DVT ppx- teds    [facility]   [phone]

## 2023-06-01 NOTE — PATIENT CHOICE NOTE. - NSPTCHOICESTATE_GEN_ALL_CORE

## 2023-06-02 ENCOUNTER — TRANSCRIPTION ENCOUNTER (OUTPATIENT)
Age: 58
End: 2023-06-02

## 2023-06-02 VITALS
OXYGEN SATURATION: 97 % | DIASTOLIC BLOOD PRESSURE: 82 MMHG | RESPIRATION RATE: 17 BRPM | HEART RATE: 66 BPM | TEMPERATURE: 98 F | SYSTOLIC BLOOD PRESSURE: 136 MMHG

## 2023-06-02 LAB
A1C WITH ESTIMATED AVERAGE GLUCOSE RESULT: 7 % — HIGH (ref 4–5.6)
ESTIMATED AVERAGE GLUCOSE: 154 MG/DL — HIGH (ref 68–114)

## 2023-06-02 PROCEDURE — C1751: CPT

## 2023-06-02 PROCEDURE — 80048 BASIC METABOLIC PNL TOTAL CA: CPT

## 2023-06-02 PROCEDURE — 83605 ASSAY OF LACTIC ACID: CPT

## 2023-06-02 PROCEDURE — 76937 US GUIDE VASCULAR ACCESS: CPT

## 2023-06-02 PROCEDURE — 87040 BLOOD CULTURE FOR BACTERIA: CPT

## 2023-06-02 PROCEDURE — 85610 PROTHROMBIN TIME: CPT

## 2023-06-02 PROCEDURE — 82962 GLUCOSE BLOOD TEST: CPT

## 2023-06-02 PROCEDURE — 85652 RBC SED RATE AUTOMATED: CPT

## 2023-06-02 PROCEDURE — 36415 COLL VENOUS BLD VENIPUNCTURE: CPT

## 2023-06-02 PROCEDURE — 99285 EMERGENCY DEPT VISIT HI MDM: CPT

## 2023-06-02 PROCEDURE — 73130 X-RAY EXAM OF HAND: CPT

## 2023-06-02 PROCEDURE — A9579: CPT

## 2023-06-02 PROCEDURE — 85730 THROMBOPLASTIN TIME PARTIAL: CPT

## 2023-06-02 PROCEDURE — 93005 ELECTROCARDIOGRAM TRACING: CPT

## 2023-06-02 PROCEDURE — 80053 COMPREHEN METABOLIC PANEL: CPT

## 2023-06-02 PROCEDURE — 73220 MRI UPPR EXTREMITY W/O&W/DYE: CPT

## 2023-06-02 PROCEDURE — 83036 HEMOGLOBIN GLYCOSYLATED A1C: CPT

## 2023-06-02 PROCEDURE — 85025 COMPLETE CBC W/AUTO DIFF WBC: CPT

## 2023-06-02 PROCEDURE — 87205 SMEAR GRAM STAIN: CPT

## 2023-06-02 PROCEDURE — 36573 INSJ PICC RS&I 5 YR+: CPT

## 2023-06-02 PROCEDURE — 87077 CULTURE AEROBIC IDENTIFY: CPT

## 2023-06-02 PROCEDURE — 86140 C-REACTIVE PROTEIN: CPT

## 2023-06-02 PROCEDURE — 87070 CULTURE OTHR SPECIMN AEROBIC: CPT

## 2023-06-02 RX ORDER — CEFTRIAXONE 500 MG/1
2000 INJECTION, POWDER, FOR SOLUTION INTRAMUSCULAR; INTRAVENOUS ONCE
Refills: 0 | Status: COMPLETED | OUTPATIENT
Start: 2023-06-02 | End: 2023-06-02

## 2023-06-02 RX ORDER — BACITRACIN ZINC 500 UNIT/G
1 OINTMENT IN PACKET (EA) TOPICAL
Qty: 0 | Refills: 0 | DISCHARGE
Start: 2023-06-02

## 2023-06-02 RX ORDER — SACCHAROMYCES BOULARDII 250 MG
1 POWDER IN PACKET (EA) ORAL
Qty: 30 | Refills: 0
Start: 2023-06-02

## 2023-06-02 RX ORDER — CEFTRIAXONE 500 MG/1
1 INJECTION, POWDER, FOR SOLUTION INTRAMUSCULAR; INTRAVENOUS
Qty: 0 | Refills: 0 | DISCHARGE
Start: 2023-06-02 | End: 2023-06-28

## 2023-06-02 RX ORDER — SACCHAROMYCES BOULARDII 250 MG
1 POWDER IN PACKET (EA) ORAL
Qty: 0 | Refills: 0 | DISCHARGE
Start: 2023-06-02

## 2023-06-02 RX ORDER — BACITRACIN ZINC 500 UNIT/G
1 OINTMENT IN PACKET (EA) TOPICAL
Qty: 1 | Refills: 0
Start: 2023-06-02

## 2023-06-02 RX ADMIN — Medication 1 APPLICATION(S): at 06:15

## 2023-06-02 RX ADMIN — Medication 1000 MILLIGRAM(S): at 06:50

## 2023-06-02 RX ADMIN — Medication 250 MILLIGRAM(S): at 06:15

## 2023-06-02 RX ADMIN — LOSARTAN POTASSIUM 100 MILLIGRAM(S): 100 TABLET, FILM COATED ORAL at 06:15

## 2023-06-02 RX ADMIN — CEFTRIAXONE 100 MILLIGRAM(S): 500 INJECTION, POWDER, FOR SOLUTION INTRAMUSCULAR; INTRAVENOUS at 11:45

## 2023-06-02 RX ADMIN — Medication 1000 MILLIGRAM(S): at 06:15

## 2023-06-02 RX ADMIN — Medication 50 MICROGRAM(S): at 06:15

## 2023-06-02 NOTE — DISCHARGE NOTE PROVIDER - HOSPITAL COURSE
57 yo male with PMHx of type 2 DM, HTN, HLD, hypothyroidism, pituitary adenoma presented to the ED complaining of cat bite to right hand with redness, swelling and fever, cat bit into his second MCP joint.   Seen by Plastics/Hand and underwent initial I&D of right hand and later OR Joint washout 5/31  -all micro is NGTD, no MRSA, no pseudomonas    MRI-Moderate joint effusion of the second metacarpophalangeal joint with enhancing synovitis, concerning for septic arthritis given the history of a puncture wound. Minimal osseous edema along the dorsal aspect of the second metacarpal head/neck without T1 marrow signal abnormality, which may represent contusion or be related to osteitis/early osteoarthritis.    Ceftriaxone 2 grams IV daily with  last day 6/28  weekly labs CBC, CMP, ESR faxed to [phone]  after IV abx course may extend with oral Rx    Wound care after discharge - wash wound daily with warm soapy water then dress them with either bacitracin or neosporin and wrap or cover with guaze.    follow up with Dr. Valente in the office in 1 week.   no

## 2023-06-02 NOTE — DISCHARGE NOTE PROVIDER - NSDCCPCAREPLAN_GEN_ALL_CORE_FT
PRINCIPAL DISCHARGE DIAGNOSIS  Diagnosis: Cellulitis of right hand  Assessment and Plan of Treatment: sp I&D and wash out by plastics, daily wound care- fu Dr huffman  ceftriaxone iv via midline till 6/28 thereafter possible switch to oral pending clinical response- fu ID Dr moore

## 2023-06-02 NOTE — PROGRESS NOTE ADULT - ASSESSMENT
59 yo male with PMHx of type 2 DM, HTN, HLD, hypothyroidism, pituitary adenoma presented to the ED complaining of cat bite to right hand with redness, swelling and fever, his mother-in-law's cat bit his right hand into his second MCP joint. Seen by Hand and underwent initial I&D of right hand.  MRI-Moderate joint effusion of the second metacarpophalangeal joint with enhancing synovitis, concerning for septic arthritis given the history of a puncture wound. Minimal osseous edema along the dorsal aspect of the second metacarpal head/neck without T1 marrow signal abnormality, which may represent contusion or be related to osteitis/early osteoarthritis.    RECOMMENDATIONS  Cat bite with what appears to be penetration into second MCP joint so septic arthritis, concerns for cat mouth microbes and skin. PCN allergy is just a rash and questionable.  -hand surgeon to took to OR for joint washout. (Incision and drainage, wound, hand 31-May-2023 09:15:14  Anupam Orozco)  -all micro is NGTD, no MRSA, no pseudomonas    -Ceftriaxone 2 grams IV daily with  last day 6/28  weekly labs CBC, CMP, ESR faxed to [phone]  possible dc 6/2 and after IV may extend with oral Rx cefuroxime 500mg PO BID with last day 7/12  called in orders to [facility] with line removal at end of abx    From an ID standpoint no further requirement for inpatient status for the management of ID issues. Fine with discharge from ID standpoint when other medical issues no longer require inpatient care and social issues allow for a safe discharge plan. To schedule an outpatient ID follow up appointment please call our office at [phone]      Thank you for consulting us and involving us in the management of this most interesting and challenging case.  We will follow along in the care of this patient. Please call us at [phone] or text me directly on my cell# at [phone] with any concerns.

## 2023-06-02 NOTE — DISCHARGE NOTE PROVIDER - CARE PROVIDER_API CALL
Penny Valente  Plastic Surgery  [address] [place] [address], [address]  [place], [place] [id number]  Phone: [phone]  Fax: [phone]  Follow Up Time: 1 week    Arash Varela  Infectious Disease  [address] [place] [id number]  Phone: [phone]  Fax: [phone]  Follow Up Time: 1 week

## 2023-06-02 NOTE — DISCHARGE NOTE PROVIDER - NSDCFUADDINST_GEN_ALL_CORE_FT
wound care instructions- wash wound daily with warm soapy water then dress them with either bacitracin or neosporin and wrap or cover with guaze.    follow up with Dr. Valente in the office in 1 week.   jadon Funks - Infectious diseases in 1-2 wks

## 2023-06-02 NOTE — PROGRESS NOTE ADULT - SUBJECTIVE AND OBJECTIVE BOX
[facility]  Arash Varela MD PhD, Kristina Patton MD, Aliyah Wolf MD, Miguelito Welsh MD, Eladio Tovar MD  and providing coverage with German Rogers MD  Providing Infectious Disease Consultations at [facility], [facility], [facility]'s    Office# [phone] to schedule follow up appointments  Answering Service for urgent calls or New Consults [phone]  Cell# to text for urgent issues Arash Varela [phone]     infectious diseases progress note:    DAYAMI HARDY is a 58y y. o. Male patient    Overnight and events of the last 24hrs reviewed    Allergies    penicillins (Rash)    Intolerances        ANTIBIOTICS/RELEVANT:  antimicrobials  cefTRIAXone   IVPB 2000 milliGRAM(s) IV Intermittent once    immunologic:    OTHER:  acetaminophen     Tablet .. 1000 milliGRAM(s) Oral every 6 hours  atorvastatin 40 milliGRAM(s) Oral at bedtime  bacitracin   Ointment 1 Application(s) Topical two times a day  dextrose 5%. 1000 milliLiter(s) IV Continuous <Continuous>  dextrose 5%. 1000 milliLiter(s) IV Continuous <Continuous>  dextrose 50% Injectable 12.5 Gram(s) IV Push once  dextrose 50% Injectable 25 Gram(s) IV Push once  dextrose 50% Injectable 25 Gram(s) IV Push once  dextrose Oral Gel 15 Gram(s) Oral once PRN  enoxaparin Injectable 40 milliGRAM(s) SubCutaneous every 24 hours  glucagon  Injectable 1 milliGRAM(s) IntraMuscular once  insulin lispro (ADMELOG) corrective regimen sliding scale   SubCutaneous three times a day before meals  levothyroxine 50 MICROGram(s) Oral daily  losartan 100 milliGRAM(s) Oral daily  melatonin 3 milliGRAM(s) Oral at bedtime PRN  ondansetron Injectable 4 milliGRAM(s) IV Push every 8 hours PRN  oxyCODONE    IR 5 milliGRAM(s) Oral every 4 hours PRN  saccharomyces boulardii 250 milliGRAM(s) Oral two times a day      Objective:  Vital Signs Last 24 Hrs  T(C): 36.7 (02 Jun 2023 05:21), Max: 36.7 (01 Jun 2023 21:00)  T(F): 98 (02 Jun 2023 05:21), Max: 98 (01 Jun 2023 21:00)  HR: 61 (02 Jun 2023 05:21) (61 - 68)  BP: 134/81 (02 Jun 2023 05:21) (128/85 - 157/88)  BP(mean): --  RR: 17 (02 Jun 2023 05:21) (16 - 17)  SpO2: 92% (02 Jun 2023 05:21) (92% - 93%)    Parameters below as of 02 Jun 2023 05:21  Patient On (Oxygen Delivery Method): room air        T(C): 36.7 (06-02-23 @ 05:21), Max: 36.8 (06-01-23 @ 05:07)  T(C): 36.7 (06-02-23 @ 05:21), Max: 37 (05-31-23 @ 06:29)  T(C): 36.7 (06-02-23 @ 05:21), Max: 37 (05-29-23 @ 20:47)    PHYSICAL EXAM:  HEENT: NC atraumatic  Neck: supple  Respiratory: no accessory muscle use, breathing comfortably  Cardiovascular: distant  Gastrointestinal: normal appearing, nondistended  Extremities: no clubbing, no cyanosis,        LABS:        WBC  7.37 05-30 @ 10:05  11.40 05-28 @ 19:18              Creatinine, Serum: 1.30 mg/dL (05-30-23 @ 10:05)  Creatinine, Serum: 1.40 mg/dL (05-28-23 @ 19:18)                INFLAMMATORY MARKERS      MICROBIOLOGY:              RADIOLOGY & ADDITIONAL STUDIES:  
Patient is a 58y old  Male who presents with a chief complaint of cat bite cellulitis of hand (31 May 2023 07:16)      INTERVAL HPI/OVERNIGHT EVENTS: noted  pt seen and examined this am   events noted  sp OR-hand jt washout      Vital Signs Last 24 Hrs  T(C): 36.4 (31 May 2023 12:03), Max: 37 (31 May 2023 06:29)  T(F): 97.6 (31 May 2023 12:03), Max: 98.6 (31 May 2023 06:29)  HR: 80 (31 May 2023 12:03) (62 - 88)  BP: 125/80 (31 May 2023 12:03) (106/81 - 150/93)  BP(mean): --  RR: 18 (31 May 2023 12:03) (12 - 18)  SpO2: 91% (31 May 2023 12:03) (91% - 100%)    Parameters below as of 31 May 2023 12:03  Patient On (Oxygen Delivery Method): room air        acetaminophen     Tablet .. 1000 milliGRAM(s) Oral every 6 hours  atorvastatin 40 milliGRAM(s) Oral at bedtime  cefTRIAXone   IVPB 1000 milliGRAM(s) IV Intermittent every 24 hours  dextrose 5%. 1000 milliLiter(s) IV Continuous <Continuous>  dextrose 5%. 1000 milliLiter(s) IV Continuous <Continuous>  dextrose 50% Injectable 25 Gram(s) IV Push once  dextrose 50% Injectable 12.5 Gram(s) IV Push once  dextrose 50% Injectable 25 Gram(s) IV Push once  dextrose Oral Gel 15 Gram(s) Oral once PRN  doxycycline monohydrate Capsule 100 milliGRAM(s) Oral every 12 hours  glucagon  Injectable 1 milliGRAM(s) IntraMuscular once  insulin lispro (ADMELOG) corrective regimen sliding scale   SubCutaneous three times a day before meals  levothyroxine 50 MICROGram(s) Oral daily  losartan 100 milliGRAM(s) Oral daily  melatonin 3 milliGRAM(s) Oral at bedtime PRN  ondansetron Injectable 4 milliGRAM(s) IV Push every 8 hours PRN  oxyCODONE    IR 5 milliGRAM(s) Oral every 4 hours PRN  saccharomyces boulardii 250 milliGRAM(s) Oral two times a day      PHYSICAL EXAM:  GENERAL: NAD,   EYES: conjunctiva and sclera clear  ENMT: Moist mucous membranes  NECK: Supple, No JVD, Normal thyroid  CHEST/LUNG: non labored, cta b/l  HEART: Regular rate and rhythm; No murmurs, rubs, or gallops  ABDOMEN: Soft, Nontender, Nondistended; Bowel sounds present  EXTREMITIES:  2+ Peripheral Pulses, No clubbing, cyanosis, or edema  LYMPH: No lymphadenopathy noted  SKIN: No rashes or lesions    Consultant(s) Notes Reviewed:  [x ] YES  [ ] NO  Care Discussed with Consultants/Other Providers [ x] YES  [ ] NO    LABS:                        16.9   7.37  )-----------( 207      ( 30 May 2023 10:05 )             50.6     05-30    135  |  105  |  13  ----------------------------<  227<H>  4.2   |  28  |  1.30    Ca    9.1      30 May 2023 10:05          CAPILLARY BLOOD GLUCOSE      POCT Blood Glucose.: 135 mg/dL (31 May 2023 08:33)  POCT Blood Glucose.: 144 mg/dL (31 May 2023 06:50)  POCT Blood Glucose.: 104 mg/dL (30 May 2023 17:28)            Culture - Blood (collected 28 May 2023 19:18)  Source: .Blood Blood-Peripheral  Preliminary Report (30 May 2023 01:02):    No growth to date.    Culture - Blood (collected 28 May 2023 19:10)  Source: .Blood Blood-Peripheral  Preliminary Report (30 May 2023 01:02):    No growth to date.        RADIOLOGY & ADDITIONAL TESTS:    Imaging Personally Reviewed:  [x ] YES  [ ] NO
MRI results discussed with pt, including options/alts/risks/cxs.  Pt agrees to proceed with further I&D and wash out.  Will likely have some permanent stiffness and need for OT/PT.
[facility]  Arash Varela MD PhD, Kristina Patton MD, Aliyah Wolf MD, Miguelito Welsh MD, Eladio Tovar MD  and providing coverage with German Rogers MD  Providing Infectious Disease Consultations at [facility], [facility], [place], [place], [facility], [facility]'s    Office# [phone] to schedule follow up appointments  Answering Service for urgent calls or New Consults [phone]  Cell# to text for urgent issues Arash Varela [phone]     infectious diseases progress note:    DAYAMI HARDY is a 58y y. o. Male patient    Overnight and events of the last 24hrs reviewed    Allergies    penicillins (Rash)    Intolerances        ANTIBIOTICS/RELEVANT:  antimicrobials  cefTRIAXone   IVPB 1000 milliGRAM(s) IV Intermittent every 24 hours  doxycycline monohydrate Capsule 100 milliGRAM(s) Oral every 12 hours    immunologic:    OTHER:  acetaminophen     Tablet .. 1000 milliGRAM(s) Oral every 6 hours  atorvastatin 40 milliGRAM(s) Oral at bedtime  dextrose 5%. 1000 milliLiter(s) IV Continuous <Continuous>  dextrose 5%. 1000 milliLiter(s) IV Continuous <Continuous>  dextrose 50% Injectable 25 Gram(s) IV Push once  dextrose 50% Injectable 12.5 Gram(s) IV Push once  dextrose 50% Injectable 25 Gram(s) IV Push once  dextrose Oral Gel 15 Gram(s) Oral once PRN  glucagon  Injectable 1 milliGRAM(s) IntraMuscular once  insulin lispro (ADMELOG) corrective regimen sliding scale   SubCutaneous three times a day before meals  levothyroxine 50 MICROGram(s) Oral daily  losartan 100 milliGRAM(s) Oral daily  melatonin 3 milliGRAM(s) Oral at bedtime PRN  ondansetron Injectable 4 milliGRAM(s) IV Push every 8 hours PRN  oxyCODONE    IR 5 milliGRAM(s) Oral every 4 hours PRN  saccharomyces boulardii 250 milliGRAM(s) Oral two times a day      Objective:  Vital Signs Last 24 Hrs  T(C): 36.4 (31 May 2023 12:03), Max: 37 (31 May 2023 06:29)  T(F): 97.6 (31 May 2023 12:03), Max: 98.6 (31 May 2023 06:29)  HR: 80 (31 May 2023 12:03) (62 - 88)  BP: 125/80 (31 May 2023 12:03) (106/81 - 150/93)  BP(mean): --  RR: 18 (31 May 2023 12:03) (12 - 18)  SpO2: 91% (31 May 2023 12:03) (91% - 100%)    Parameters below as of 31 May 2023 12:03  Patient On (Oxygen Delivery Method): room air        T(C): 36.4 (05-31-23 @ 12:03), Max: 37 (05-29-23 @ 20:47)  T(C): 36.4 (05-31-23 @ 12:03), Max: 38.1 (05-28-23 @ 18:33)  T(C): 36.4 (05-31-23 @ 12:03), Max: 38.1 (05-28-23 @ 18:33)    PHYSICAL EXAM:  HEENT: NC atraumatic  Neck: supple  Respiratory: no accessory muscle use, breathing comfortably  Cardiovascular: distant  Gastrointestinal: normal appearing, nondistended  Extremities: no clubbing, no cyanosis, right hand is wrapped        LABS:                          16.9   7.37  )-----------( 207      ( 30 May 2023 10:05 )             50.6       WBC  7.37 05-30 @ 10:05  11.40 05-28 @ 19:18      05-30    135  |  105  |  13  ----------------------------<  227<H>  4.2   |  28  |  1.30    Ca    9.1      30 May 2023 10:05        Creatinine, Serum: 1.30 mg/dL (05-30-23 @ 10:05)  Creatinine, Serum: 1.40 mg/dL (05-28-23 @ 19:18)                INFLAMMATORY MARKERS      MICROBIOLOGY:    Culture - Abscess with Gram Stain (05.30.23 @ 10:40)    Specimen Source: .Abscess right hand   Culture Results:   Insufficient growth-culture reincubated        RADIOLOGY & ADDITIONAL STUDIES:  
[facility]OUS DISEASE  Arash Varela MD PhD, Kristina Patton MD, Aliyah Wolf MD, Miguelito Welsh MD, Eladio Tovar MD  and providing coverage with German Rogers MD  Providing Infectious Disease Consultations at [facility], [facility], [place], [facility], [facility]'s    Office# [phone] to schedule follow up appointments  Answering Service for urgent calls or New Consults [phone]  Cell# to text for urgent issues Arash Varela [phone]     infectious diseases progress note:    DAYAMI HARDY is a 58y y. o. Male patient    Overnight and events of the last 24hrs reviewed    Allergies    penicillins (Rash)    Intolerances        ANTIBIOTICS/RELEVANT:  antimicrobials  cefTRIAXone   IVPB 1000 milliGRAM(s) IV Intermittent every 24 hours    immunologic:    OTHER:  acetaminophen     Tablet .. 1000 milliGRAM(s) Oral every 6 hours  atorvastatin 40 milliGRAM(s) Oral at bedtime  dextrose 5%. 1000 milliLiter(s) IV Continuous <Continuous>  dextrose 5%. 1000 milliLiter(s) IV Continuous <Continuous>  dextrose 50% Injectable 25 Gram(s) IV Push once  dextrose 50% Injectable 12.5 Gram(s) IV Push once  dextrose 50% Injectable 25 Gram(s) IV Push once  dextrose Oral Gel 15 Gram(s) Oral once PRN  enoxaparin Injectable 40 milliGRAM(s) SubCutaneous every 24 hours  glucagon  Injectable 1 milliGRAM(s) IntraMuscular once  insulin lispro (ADMELOG) corrective regimen sliding scale   SubCutaneous three times a day before meals  levothyroxine 50 MICROGram(s) Oral daily  losartan 100 milliGRAM(s) Oral daily  melatonin 3 milliGRAM(s) Oral at bedtime PRN  ondansetron Injectable 4 milliGRAM(s) IV Push every 8 hours PRN  oxyCODONE    IR 5 milliGRAM(s) Oral every 4 hours PRN  saccharomyces boulardii 250 milliGRAM(s) Oral two times a day      Objective:  Vital Signs Last 24 Hrs  T(C): 36.8 (01 Jun 2023 05:07), Max: 36.8 (01 Jun 2023 05:07)  T(F): 98.2 (01 Jun 2023 05:07), Max: 98.2 (01 Jun 2023 05:07)  HR: 64 (01 Jun 2023 05:07) (64 - 87)  BP: 123/77 (01 Jun 2023 05:07) (123/77 - 160/91)  BP(mean): --  RR: 17 (01 Jun 2023 05:07) (17 - 18)  SpO2: 91% (01 Jun 2023 05:07) (91% - 97%)    Parameters below as of 01 Jun 2023 05:07  Patient On (Oxygen Delivery Method): room air        T(C): 36.8 (06-01-23 @ 05:07), Max: 37 (05-31-23 @ 06:29)  T(C): 36.8 (06-01-23 @ 05:07), Max: 37 (05-29-23 @ 20:47)  T(C): 36.8 (06-01-23 @ 05:07), Max: 38.1 (05-28-23 @ 18:33)    PHYSICAL EXAM:  HEENT: NC atraumatic  Neck: supple  Respiratory: no accessory muscle use, breathing comfortably  Cardiovascular: distant  Gastrointestinal: normal appearing, nondistended  Extremities: no clubbing, no cyanosis, right hand is wrapped        LABS:        WBC  7.37 05-30 @ 10:05  11.40 05-28 @ 19:18              Creatinine, Serum: 1.30 mg/dL (05-30-23 @ 10:05)  Creatinine, Serum: 1.40 mg/dL (05-28-23 @ 19:18)                INFLAMMATORY MARKERS      MICROBIOLOGY:              RADIOLOGY & ADDITIONAL STUDIES:  
Patient is a 58y old  Male who presents with a chief complaint of cat bite cellulitis of hand (29 May 2023 12:52)        INTERVAL HPI/OVERNIGHT EVENTS:   no complaints  pt seen and examined         Vital Signs Last 24 Hrs  T(C): 36.4 (29 May 2023 13:27), Max: 38.1 (28 May 2023 18:33)  T(F): 97.5 (29 May 2023 13:27), Max: 100.6 (28 May 2023 18:33)  HR: 83 (29 May 2023 13:27) (64 - 102)  BP: 155/82 (29 May 2023 13:27) (108/77 - 155/82)  BP(mean): --  RR: 18 (29 May 2023 13:27) (16 - 18)  SpO2: 94% (29 May 2023 13:27) (93% - 96%)    Parameters below as of 29 May 2023 13:27  Patient On (Oxygen Delivery Method): room air        acetaminophen     Tablet .. 650 milliGRAM(s) Oral every 6 hours PRN  atorvastatin 40 milliGRAM(s) Oral at bedtime  dextrose 5%. 1000 milliLiter(s) IV Continuous <Continuous>  dextrose 5%. 1000 milliLiter(s) IV Continuous <Continuous>  dextrose 50% Injectable 25 Gram(s) IV Push once  dextrose 50% Injectable 25 Gram(s) IV Push once  dextrose 50% Injectable 12.5 Gram(s) IV Push once  dextrose Oral Gel 15 Gram(s) Oral once PRN  doxycycline IVPB 100 milliGRAM(s) IV Intermittent every 12 hours  enoxaparin Injectable 40 milliGRAM(s) SubCutaneous every 24 hours  glucagon  Injectable 1 milliGRAM(s) IntraMuscular once  insulin lispro (ADMELOG) corrective regimen sliding scale   SubCutaneous at bedtime  insulin lispro (ADMELOG) corrective regimen sliding scale   SubCutaneous three times a day before meals  levothyroxine 50 MICROGram(s) Oral daily  losartan 100 milliGRAM(s) Oral daily  melatonin 3 milliGRAM(s) Oral at bedtime PRN  metroNIDAZOLE  IVPB 500 milliGRAM(s) IV Intermittent every 8 hours  ondansetron Injectable 4 milliGRAM(s) IV Push every 8 hours PRN  saccharomyces boulardii 250 milliGRAM(s) Oral two times a day      PHYSICAL EXAM:  GENERAL: NAD   EYES: conjunctiva and sclera clear  ENMT: Moist mucous membranes  NECK: Supple, No JVD, Normal thyroid  CHEST/LUNG: non labored, cta b/l  HEART: Regular rate and rhythm; No murmurs, rubs, or gallops  ABDOMEN: Soft, Nontender, Nondistended; Bowel sounds present  EXTREMITIES:  2+ Peripheral Pulses, No clubbing, cyanosis. rt hand with bite marks, swelling, reduced ROM  LYMPH: No lymphadenopathy noted  SKIN: No rashes or lesions    Consultant(s) Notes Reviewed:  [x ] YES  [ ] NO  Care Discussed with Consultants/Other Providers [ x] YES  [ ] NO    LABS:                        18.8   11.40 )-----------( 277      ( 28 May 2023 19:18 )             54.8     05-28    139  |  105  |  17  ----------------------------<  122<H>  4.2   |  28  |  1.40<H>    Ca    10.0      28 May 2023 19:18    TPro  8.7<H>  /  Alb  4.6  /  TBili  0.7  /  DBili  x   /  AST  26  /  ALT  51  /  AlkPhos  38<L>  05-28    PT/INR - ( 28 May 2023 19:18 )   PT: 12.1 sec;   INR: 1.03 ratio         PTT - ( 28 May 2023 19:18 )  PTT:30.4 sec    CAPILLARY BLOOD GLUCOSE      POCT Blood Glucose.: 115 mg/dL (29 May 2023 12:18)  POCT Blood Glucose.: 130 mg/dL (29 May 2023 08:20)  POCT Blood Glucose.: 157 mg/dL (28 May 2023 21:43)              RADIOLOGY & ADDITIONAL TESTS:    Imaging Personally Reviewed  Reviewed consultants input
[facility]  Arash Varela MD PhD, Ben Patton MD, Aliyah Wolf MD, Miguelito Welsh MD, Eladio Tovar MD  and providing coverage with German Rogers MD  Providing Infectious Disease Consultations at [facility], [facility], [facility], [facility]'s    Office# [phone] to schedule follow up appointments  Answering Service for urgent calls or New Consults [phone]  Cell# to text for urgent issues Arash Varela [phone]     infectious diseases progress note:    DAYAMI HARDY is a 58y y. o. Male patient    Overnight and events of the last 24hrs reviewed    Allergies    penicillins (Rash)    Intolerances        ANTIBIOTICS/RELEVANT:  antimicrobials  doxycycline IVPB 100 milliGRAM(s) IV Intermittent every 12 hours  metroNIDAZOLE  IVPB 500 milliGRAM(s) IV Intermittent every 8 hours    immunologic:    OTHER:  acetaminophen     Tablet .. 650 milliGRAM(s) Oral every 6 hours PRN  atorvastatin 40 milliGRAM(s) Oral at bedtime  dextrose 5%. 1000 milliLiter(s) IV Continuous <Continuous>  dextrose 5%. 1000 milliLiter(s) IV Continuous <Continuous>  dextrose 50% Injectable 25 Gram(s) IV Push once  dextrose 50% Injectable 25 Gram(s) IV Push once  dextrose 50% Injectable 12.5 Gram(s) IV Push once  dextrose Oral Gel 15 Gram(s) Oral once PRN  enoxaparin Injectable 40 milliGRAM(s) SubCutaneous every 24 hours  glucagon  Injectable 1 milliGRAM(s) IntraMuscular once  insulin lispro (ADMELOG) corrective regimen sliding scale   SubCutaneous at bedtime  insulin lispro (ADMELOG) corrective regimen sliding scale   SubCutaneous three times a day before meals  levothyroxine 50 MICROGram(s) Oral daily  losartan 100 milliGRAM(s) Oral daily  melatonin 3 milliGRAM(s) Oral at bedtime PRN  ondansetron Injectable 4 milliGRAM(s) IV Push every 8 hours PRN  saccharomyces boulardii 250 milliGRAM(s) Oral two times a day      Objective:  Vital Signs Last 24 Hrs  T(C): 36.6 (30 May 2023 05:27), Max: 37 (29 May 2023 20:47)  T(F): 97.9 (30 May 2023 05:27), Max: 98.6 (29 May 2023 20:47)  HR: 68 (30 May 2023 05:27) (68 - 83)  BP: 115/74 (30 May 2023 05:27) (115/74 - 155/82)  BP(mean): --  RR: 17 (30 May 2023 05:27) (17 - 18)  SpO2: 95% (30 May 2023 05:27) (94% - 95%)    Parameters below as of 30 May 2023 05:27  Patient On (Oxygen Delivery Method): room air        T(C): 36.6 (05-30-23 @ 05:27), Max: 38.1 (05-28-23 @ 18:33)  T(C): 36.6 (05-30-23 @ 05:27), Max: 38.1 (05-28-23 @ 18:33)  T(C): 36.6 (05-30-23 @ 05:27), Max: 38.1 (05-28-23 @ 18:33)    PHYSICAL EXAM:  HEENT: NC atraumatic  Neck: supple  Respiratory: no accessory muscle use, breathing comfortably  Cardiovascular: distant  Gastrointestinal: normal appearing, nondistended  Extremities: no clubbing, no cyanosis, right hand is wrapped        LABS:                          16.9   7.37  )-----------( 207      ( 30 May 2023 10:05 )             50.6       WBC  7.37 05-30 @ 10:05  11.40 05-28 @ 19:18      05-30    135  |  105  |  13  ----------------------------<  227<H>  4.2   |  28  |  1.30    Ca    9.1      30 May 2023 10:05    TPro  8.7<H>  /  Alb  4.6  /  TBili  0.7  /  DBili  x   /  AST  26  /  ALT  51  /  AlkPhos  38<L>  05-28      Creatinine, Serum: 1.30 mg/dL (05-30-23 @ 10:05)  Creatinine, Serum: 1.40 mg/dL (05-28-23 @ 19:18)      PT/INR - ( 28 May 2023 19:18 )   PT: 12.1 sec;   INR: 1.03 ratio         PTT - ( 28 May 2023 19:18 )  PTT:30.4 sec          INFLAMMATORY MARKERS      MICROBIOLOGY:    Culture - Blood (05.28.23 @ 19:18)    Specimen Source: .Blood Blood-Peripheral   Culture Results:   No growth to date.        RADIOLOGY & ADDITIONAL STUDIES:  < from: MR Hand w/wo IV Cont, Right (05.30.23 @ 09:14) >    ACC: [id number] EXAM:  MR HAND WAW IC RT   ORDERED BY: BEN PATTON     PROCEDURE DATE:  05/30/2023          INTERPRETATION:  History: Cat bite, evaluate for deep infection    Technique: Multiplanar and multisequence MRI images were obtained through   the right hand without and with contrast. Approximately 10 cc intravenous   Gadavist was administered.    Comparison: Comparison hand radiographs dated 5/28/2023    Findings:    Please note that the mid to distal aspect of the second through fifth   digits are incompletely imaged.    There is a moderate joint effusion of the second metacarpophalangeal   joint with enhancing synovitis and surrounding edema. There is minimal   edema within the dorsal aspect of the second metacarpal head/neck without   T1 marrow signal abnormality, which may represent contusion or be related   to osteitis/early osteomyelitis.    Mild tenosynovitis of the second digit flexor mechanism with trace   tenosynovitis of the third digit flexor mechanism. Mild peritendinitis of   the second and third extensor tendons, as well as trace peritendinitis of   the fourth digit extensor tendon.    Subcutaneous edema about the dorsal aspect of the hand. No drainable soft   tissue fluid collection.    Reactive edema within the musculature surrounding the second MCP.   Remaining musculature is otherwise unremarkable. There is a bifid median   nerve likely persistent median artery.    Impression:    Moderate joint effusion of the second metacarpophalangeal joint with   enhancing synovitis, concerning for septic arthritis given the history of   a puncture wound. Minimal osseous edema along the dorsal aspect of the   second metacarpal head/neck without T1 marrow signal abnormality, which   may represent contusion or be related to osteitis/early osteoarthritis.    Mild tenosynovitis of the second digit flexor mechanism with trace   tenosynovitis of the third digit flexor mechanism, which may be reactive   or infectious in etiology.    Mild peritendinitis of the second and third extensor tendons, as well as   trace peritendinitis of the fourth digit extensor tendon, which may be   reactive or infectious in etiology.    Subcutaneous edema about the dorsal aspect of the hand, likely   cellulitis. No drainable fluid collection seen within the dorsal soft   tissues of the hand.    
PLASTIC SURGERY PROGRESS NOTE      s/p I&D of Right Hand 5/31, POD #1      SUBJECTIVE:  Patient examined at bedside, denies pain in the hand, says there is some numbness of his 1st finger (was given a nerve block yesterday in the OR).  Reassured him the feeling would return  No nausea, no vomiting  Tolerating diet  No fevers overnight  On Rocephin        OBJECTIVE:  VITALS:  T(F): 97.6 (06-01-23 @ 12:34), Max: 98.2 (06-01-23 @ 05:07)  HR: 68 (06-01-23 @ 12:34) (64 - 87)  BP: 128/85 (06-01-23 @ 12:34) (123/77 - 160/91)  RR: 16 (06-01-23 @ 12:34) (16 - 18)  SpO2: 93% (06-01-23 @ 12:34) (91% - 97%)        05-31 @ 07:01  -  06-01 @ 07:00  --------------------------------------------------------  IN:    Lactated Ringers: 250 mL    Oral Fluid: 100 mL  Total IN: 350 mL    OUT:  Total OUT: 0 mL    Total NET: 350 mL        LABS: n/a        PHYSICAL EXAM:   General: AAO x3, No acute distress  Skin: No jaundice, no icterus  Respiratory: clear to auscultation bilaterally, no wheezes / rhonchi / rales, trachea midline  Cardiac: S1 & S2 present, rate and rhythm are regular  Abdomen: soft, nontender, nondistended, no rebound tenderness, no guarding, no palpable masses  Extremities: right hand dressing removed. All incisions appear clean, no purulent drainage, no skin erythema, no crepitus. Hand was soaked in saline for 30 minutes, dried, bacitracin placed on the wounds and re-wrapped.   
Patient is a 58y old  Male who presents with a chief complaint of cat bite cellulitis of hand (01 Jun 2023 11:33)      INTERVAL HPI/OVERNIGHT EVENTS: noted  pt seen and examined this am   events noted  feels well      Vital Signs Last 24 Hrs  T(C): 36.4 (01 Jun 2023 12:34), Max: 36.8 (01 Jun 2023 05:07)  T(F): 97.6 (01 Jun 2023 12:34), Max: 98.2 (01 Jun 2023 05:07)  HR: 68 (01 Jun 2023 12:34) (64 - 87)  BP: 128/85 (01 Jun 2023 12:34) (123/77 - 160/91)  BP(mean): --  RR: 16 (01 Jun 2023 12:34) (16 - 18)  SpO2: 93% (01 Jun 2023 12:34) (91% - 97%)    Parameters below as of 01 Jun 2023 12:34  Patient On (Oxygen Delivery Method): room air        acetaminophen     Tablet .. 1000 milliGRAM(s) Oral every 6 hours  atorvastatin 40 milliGRAM(s) Oral at bedtime  dextrose 5%. 1000 milliLiter(s) IV Continuous <Continuous>  dextrose 5%. 1000 milliLiter(s) IV Continuous <Continuous>  dextrose 50% Injectable 25 Gram(s) IV Push once  dextrose 50% Injectable 12.5 Gram(s) IV Push once  dextrose 50% Injectable 25 Gram(s) IV Push once  dextrose Oral Gel 15 Gram(s) Oral once PRN  enoxaparin Injectable 40 milliGRAM(s) SubCutaneous every 24 hours  glucagon  Injectable 1 milliGRAM(s) IntraMuscular once  insulin lispro (ADMELOG) corrective regimen sliding scale   SubCutaneous three times a day before meals  levothyroxine 50 MICROGram(s) Oral daily  losartan 100 milliGRAM(s) Oral daily  melatonin 3 milliGRAM(s) Oral at bedtime PRN  ondansetron Injectable 4 milliGRAM(s) IV Push every 8 hours PRN  oxyCODONE    IR 5 milliGRAM(s) Oral every 4 hours PRN  saccharomyces boulardii 250 milliGRAM(s) Oral two times a day      PHYSICAL EXAM:  GENERAL: NAD,   EYES: conjunctiva and sclera clear  ENMT: Moist mucous membranes  NECK: Supple, No JVD, Normal thyroid  CHEST/LUNG: non labored, cta b/l  HEART: Regular rate and rhythm; No murmurs, rubs, or gallops  ABDOMEN: Soft, Nontender, Nondistended; Bowel sounds present  EXTREMITIES:  hand dressing  LYMPH: No lymphadenopathy noted  SKIN: No rashes or lesions    Consultant(s) Notes Reviewed:  [x ] YES  [ ] NO  Care Discussed with Consultants/Other Providers [ x] YES  [ ] NO    LABS:              CAPILLARY BLOOD GLUCOSE      POCT Blood Glucose.: 142 mg/dL (01 Jun 2023 08:06)  POCT Blood Glucose.: 139 mg/dL (31 May 2023 21:20)  POCT Blood Glucose.: 215 mg/dL (31 May 2023 17:14)            Culture - Surgical Swab (collected 31 May 2023 08:07)  Source: .Surgical Swab RIGHT INDEX FINGER TENDON  Preliminary Report (01 Jun 2023 07:35):    No growth    Culture - Surgical Swab (collected 31 May 2023 08:00)  Source: .Surgical Swab RIGHT HAND  Preliminary Report (01 Jun 2023 07:38):    No growth    Culture - Abscess with Gram Stain (collected 30 May 2023 10:40)  Source: .Abscess right hand  Preliminary Report (01 Jun 2023 11:37):    Normal skin li isolated        RADIOLOGY & ADDITIONAL TESTS:    Imaging Personally Reviewed:  [x ] YES  [ ] NO
Patient is a 58y old  Male who presents with a chief complaint of cat bite cellulitis of hand (30 May 2023 11:20)        INTERVAL HPI/OVERNIGHT EVENTS:   no complaints  pt seen and examined         Vital Signs Last 24 Hrs  T(C): 36.4 (30 May 2023 12:58), Max: 37 (29 May 2023 20:47)  T(F): 97.6 (30 May 2023 12:58), Max: 98.6 (29 May 2023 20:47)  HR: 81 (30 May 2023 12:58) (68 - 81)  BP: 146/87 (30 May 2023 12:58) (115/74 - 146/87)  BP(mean): --  RR: 17 (30 May 2023 12:58) (17 - 17)  SpO2: 92% (30 May 2023 12:58) (92% - 95%)    Parameters below as of 30 May 2023 12:58  Patient On (Oxygen Delivery Method): room air        acetaminophen     Tablet .. 650 milliGRAM(s) Oral every 6 hours PRN  atorvastatin 40 milliGRAM(s) Oral at bedtime  bacitracin   Ointment 1 Application(s) Topical two times a day  cefTRIAXone   IVPB 2000 milliGRAM(s) IV Intermittent every 24 hours  dextrose 5%. 1000 milliLiter(s) IV Continuous <Continuous>  dextrose 5%. 1000 milliLiter(s) IV Continuous <Continuous>  dextrose 50% Injectable 25 Gram(s) IV Push once  dextrose 50% Injectable 25 Gram(s) IV Push once  dextrose 50% Injectable 12.5 Gram(s) IV Push once  dextrose Oral Gel 15 Gram(s) Oral once PRN  doxycycline monohydrate Capsule 100 milliGRAM(s) Oral every 12 hours  glucagon  Injectable 1 milliGRAM(s) IntraMuscular once  levothyroxine 50 MICROGram(s) Oral daily  losartan 100 milliGRAM(s) Oral daily  melatonin 3 milliGRAM(s) Oral at bedtime PRN  metFORMIN 500 milliGRAM(s) Oral three times a day  ondansetron Injectable 4 milliGRAM(s) IV Push every 8 hours PRN  saccharomyces boulardii 250 milliGRAM(s) Oral two times a day      PHYSICAL EXAM:  GENERAL: NAD   EYES: conjunctiva and sclera clear  ENMT: Moist mucous membranes  NECK: Supple, No JVD, Normal thyroid  CHEST/LUNG: non labored, cta b/l  HEART: Regular rate and rhythm; No murmurs, rubs, or gallops  ABDOMEN: Soft, Nontender, Nondistended; Bowel sounds present  EXTREMITIES:  2+ Peripheral Pulses, No clubbing, cyanosis, or edema  LYMPH: No lymphadenopathy noted  SKIN: No rashes or lesions    Consultant(s) Notes Reviewed:  [x ] YES  [ ] NO  Care Discussed with Consultants/Other Providers [ x] YES  [ ] NO    LABS:                        16.9   7.37  )-----------( 207      ( 30 May 2023 10:05 )             50.6     05-30    135  |  105  |  13  ----------------------------<  227<H>  4.2   |  28  |  1.30    Ca    9.1      30 May 2023 10:05    TPro  8.7<H>  /  Alb  4.6  /  TBili  0.7  /  DBili  x   /  AST  26  /  ALT  51  /  AlkPhos  38<L>  05-28    PT/INR - ( 28 May 2023 19:18 )   PT: 12.1 sec;   INR: 1.03 ratio         PTT - ( 28 May 2023 19:18 )  PTT:30.4 sec    CAPILLARY BLOOD GLUCOSE      POCT Blood Glucose.: 140 mg/dL (30 May 2023 08:01)  POCT Blood Glucose.: 156 mg/dL (29 May 2023 21:30)  POCT Blood Glucose.: 115 mg/dL (29 May 2023 17:25)            Culture - Blood (collected 28 May 2023 19:18)  Source: .Blood Blood-Peripheral  Preliminary Report (30 May 2023 01:02):    No growth to date.    Culture - Blood (collected 28 May 2023 19:10)  Source: .Blood Blood-Peripheral  Preliminary Report (30 May 2023 01:02):    No growth to date.        RADIOLOGY & ADDITIONAL TESTS:    Imaging Personally Reviewed  Reviewed consultants input

## 2023-06-02 NOTE — DISCHARGE NOTE PROVIDER - PROVIDER TOKENS
PROVIDER:[TOKEN:[[id number]],FOLLOWUP:[1 week]],PROVIDER:[TOKEN:[[id number]],FOLLOWUP:[1 week]]

## 2023-06-02 NOTE — PROGRESS NOTE ADULT - PROVIDER SPECIALTY LIST ADULT
Infectious Disease
Plastic Surgery
Plastic Surgery
Infectious Disease
Hospitalist
Infectious Disease
Internal Medicine
Internal Medicine
Hospitalist
Infectious Disease

## 2023-06-02 NOTE — DISCHARGE NOTE NURSING/CASE MANAGEMENT/SOCIAL WORK - PATIENT PORTAL LINK FT
You can access the FollowMyHealth Patient Portal offered by [facility] by registering at the following website: [email or web address]. By joining [employer]’s FollowMyHealth portal, you will also be able to view your health information using other applications (apps) compatible with our system.

## 2023-06-02 NOTE — CASE MANAGEMENT PROGRESS NOTE - NSCMPROGRESSNOTE_GEN_ALL_CORE
Discussed on rounds this am.  Pt for home today after todays dose of ABX.  Accepted by [facility] for SOC 6/3.  Surgical notes reviewed, no chha needed. reviewed wound care with pt verbally and completed on d/c summary by MD.  Spoke with Dr. Wolf this am re: above plan and Dr. Varela to call prescription to vendor.  Spoke with vendors rep at present, arranged for SOC 6/3.  Pt in agreement with above d/c plan, pt given starter supplies for wound care, primary RN aware of above.

## 2023-06-02 NOTE — DISCHARGE NOTE PROVIDER - NSDCMRMEDTOKEN_GEN_ALL_CORE_FT
bacitracin 500 units/g topical ointment: 1 Apply topically to affected area 2 times a day  CABERGOLINE 0.5 MG TABLET: TAKE 1/2 TABLET BY MOUTH TWICE WEEKLY  cefTRIAXone:   JARDIANCE 10 MG TABLET: TAKE 1 TABLET BY MOUTH EVERY DAY IN MORNING  LOSARTAN POTASSIUM 100 MG TAB: TAKE 1 TABLET BY MOUTH EVERY DAY  METFORMIN HCL  MG TABLET: TAKE 3 TABLETS BY MOUTH EVERY DAY  saccharomyces boulardii lyo 250 mg oral capsule: 1 cap(s) orally 2 times a day  SIMVASTATIN 20 MG TABLET: TAKE 1 TABLET BY MOUTH EVERY DAY IN THE MORNING  SYNTHROID 50 MCG TABLET: TAKE 1 TABLET BY MOUTH EVERY DAY  TESTOSTERONE 1.62% GEL PUMP: APPLY 2 PUMP TOPICALLY 1 (ONE) TIME A DAY. APPLY TO CHEST/SHOULDER AREA. 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   bacitracin 500 units/g topical ointment: Apply topically to affected area 2 times a day  bacitracin 500 units/g topical ointment: 1 Apply topically to affected area 2 times a day  CABERGOLINE 0.5 MG TABLET: TAKE 1/2 TABLET BY MOUTH TWICE WEEKLY  cefTRIAXone: 1 gram(s) once a day  JARDIANCE 10 MG TABLET: TAKE 1 TABLET BY MOUTH EVERY DAY IN MORNING  LOSARTAN POTASSIUM 100 MG TAB: TAKE 1 TABLET BY MOUTH EVERY DAY  METFORMIN HCL  MG TABLET: TAKE 3 TABLETS BY MOUTH EVERY DAY  saccharomyces boulardii lyo 250 mg oral capsule: 1 cap(s) orally 2 times a day  saccharomyces boulardii lyo 250 mg oral capsule: 1 cap(s) orally 2 times a day  SIMVASTATIN 20 MG TABLET: TAKE 1 TABLET BY MOUTH EVERY DAY IN THE MORNING  SYNTHROID 50 MCG TABLET: TAKE 1 TABLET BY MOUTH EVERY DAY  TESTOSTERONE 1.62% GEL PUMP: APPLY 2 PUMP TOPICALLY 1 (ONE) TIME A DAY. APPLY TO CHEST/SHOULDER AREA. 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

## 2023-06-02 NOTE — PROGRESS NOTE ADULT - REASON FOR ADMISSION
cat bite cellulitis of hand

## 2023-06-03 LAB
CULTURE RESULTS: SIGNIFICANT CHANGE UP
CULTURE RESULTS: SIGNIFICANT CHANGE UP
SPECIMEN SOURCE: SIGNIFICANT CHANGE UP
SPECIMEN SOURCE: SIGNIFICANT CHANGE UP

## 2023-06-04 ENCOUNTER — TRANSCRIPTION ENCOUNTER (OUTPATIENT)
Age: 58
End: 2023-06-04

## 2023-06-04 LAB
CULTURE RESULTS: SIGNIFICANT CHANGE UP
SPECIMEN SOURCE: SIGNIFICANT CHANGE UP

## 2024-09-17 NOTE — H&P ADULT - TIME BILLING
September 17, 2024    Stephan Gill  [address]  [place] [address]             [place] [facility]  Pediatrics  [address] [place] [address]  Phone: [phone]   September 17, 2024     Patient: Stephan Gill   YOB: 2017   Date of Visit: 9/17/2024       To Whom it May Concern:    Stephan Gill was seen in my clinic on 9/17/2024. He may return to school on 9/18/24 .    Please excuse him from any classes or work missed on 9/16 and 9/17.    If you have any questions or concerns, please don't hesitate to call.    Sincerely,         Mary Mustafa MD     
activities including direct patient care, counseling and/or coordinating care, reviewing notes/lab data/imaging, and discussion with multidisciplinary team (excluding time spent on resident teaching)

## (undated) DEVICE — VENODYNE/SCD SLEEVE CALF MEDIUM

## (undated) DEVICE — DRSG COMBINE 5X9"

## (undated) DEVICE — TOURNIQUET CUFF 18" DUAL PORT SINGLE BLADDER W PLC  (BLACK)

## (undated) DEVICE — WARMING BLANKET UPPER ADULT

## (undated) DEVICE — PLV-SCD MACHINE: Type: DURABLE MEDICAL EQUIPMENT

## (undated) DEVICE — PACK MINOR WITH LAP

## (undated) DEVICE — ELCTR BOVIE TIP BLADE VALLEYLAB 6.5"

## (undated) DEVICE — VENODYNE/SCD SLEEVE CALF LARGE

## (undated) DEVICE — DRAPE 3/4 SHEET W REINFORCEMENT 56X77"

## (undated) DEVICE — DRSG CURITY GAUZE SPONGE 4 X 4" 12-PLY

## (undated) DEVICE — DRSG XEROFORM 5 X 9"

## (undated) DEVICE — DRAPE TOWEL BLUE 17" X 24"

## (undated) DEVICE — DRAIN JACKSON PRATT 10MM FLAT FULL NO TROCAR

## (undated) DEVICE — ELCTR BOVIE PENCIL SMOKE EVACUATION

## (undated) DEVICE — PLV/PSP-TOURNIQUET #2 3006KAAL: Type: DURABLE MEDICAL EQUIPMENT

## (undated) DEVICE — DRAIN RESERVOIR FOR JACKSON PRATT 100CC CARDINAL

## (undated) DEVICE — GLV 7.5 PROTEXIS (WHITE)